# Patient Record
Sex: MALE | Race: WHITE | Employment: FULL TIME | URBAN - METROPOLITAN AREA
[De-identification: names, ages, dates, MRNs, and addresses within clinical notes are randomized per-mention and may not be internally consistent; named-entity substitution may affect disease eponyms.]

---

## 2018-01-22 ENCOUNTER — NURSE TRIAGE (OUTPATIENT)
Dept: NURSING | Facility: CLINIC | Age: 58
End: 2018-01-22

## 2018-01-22 ENCOUNTER — TRANSFERRED RECORDS (OUTPATIENT)
Dept: HEALTH INFORMATION MANAGEMENT | Facility: CLINIC | Age: 58
End: 2018-01-22

## 2018-01-22 NOTE — TELEPHONE ENCOUNTER
APPT INFO    Date /Time: 1/24/18   Reason for Appt: Back Pain/LE weakness/muscle spasms   Ref Provider/Clinic: Dr Pallas, [facility]   Are there internal records? Yes/No?  IF YES, list clinic names: No   Are there outside records? Yes/No? Yes  See Above   Patient Contact (Y/N) & Call Details: No referred  Unable to access CareEverywhere   Action: Requested records     OUTSIDE RECORDS CHECKLIST     CLINIC NAME COMMENTS REC (x) IMG (x)   [facility]  x na

## 2018-01-22 NOTE — TELEPHONE ENCOUNTER
Excruciating Back pain following , falling off one step around sahara 2017 and following with [place] provider since   - given Percocet and prednisone .  Has MRI  Scheduled for today . His PCP advised [facility] and FNA agrees but  Pt is considering having MRI that scheduled 1st . Unsure what Pt will do .   .Fatoumata Patten RN [facility] nurse advisors.    Reason for Disposition    Weakness of a leg or foot (e.g., unable to bear weight, dragging foot)    Unable to walk    Additional Information    Negative: Passed out (i.e., lost consciousness, collapsed and was not responding)    Negative: Shock suspected (e.g., cold/pale/clammy skin, too weak to stand, low BP, rapid pulse)    Negative: Sounds like a life-threatening emergency to the triager    Negative: Major injury to the back (e.g., MVA, fall > 10 feet or 3 meters, penetrating injury, etc.)    Negative: Followed a tailbone injury    Negative: [1] Pain in the upper back over the ribs (rib cage) AND [2] radiates (travels, goes) into chest    Negative: [1] Pain in the upper back over the ribs (rib cage) AND [2] worsened by coughing (or clearly increases with breathing)    Negative: Back pain during pregnancy    Negative: Pain mainly in flank (i.e., in the side, over the lower ribs or just below the ribs)    Negative: [1] Unable to urinate (or only a few drops) > 4 hours AND     [2] bladder feels very full (e.g., palpable bladder or strong urge to urinate)    Negative: [1] Urinary or bowel incontinence (i.e., loss of bladder or bowel control) AND [2] new onset    Negative: Numbness in groin or rectal area (i.e., loss of sensation)    Negative: [1] SEVERE back pain (e.g., excruciating) AND [2] sudden onset AND [3] age > 60    Protocols used: BACK PAIN-ADULT-

## 2018-01-23 NOTE — TELEPHONE ENCOUNTER
Records Received From: [facility]     Date/Exam/Location  (specify location if different)   Office Notes: 8/31/17, 10/31/17, 12/29/17, 1/11/18, 1/22/18   Missing: - MRI 1/22/18

## 2018-01-24 ENCOUNTER — OFFICE VISIT (OUTPATIENT)
Dept: NEUROSURGERY | Facility: CLINIC | Age: 58
End: 2018-01-24

## 2018-01-24 ENCOUNTER — OFFICE VISIT (OUTPATIENT)
Dept: NEUROLOGY | Facility: CLINIC | Age: 58
End: 2018-01-24
Payer: COMMERCIAL

## 2018-01-24 ENCOUNTER — PRE VISIT (OUTPATIENT)
Dept: NEUROLOGY | Facility: CLINIC | Age: 58
End: 2018-01-24

## 2018-01-24 VITALS
WEIGHT: 237.1 LBS | DIASTOLIC BLOOD PRESSURE: 89 MMHG | SYSTOLIC BLOOD PRESSURE: 144 MMHG | HEIGHT: 72 IN | BODY MASS INDEX: 32.12 KG/M2 | HEART RATE: 72 BPM | OXYGEN SATURATION: 97 %

## 2018-01-24 DIAGNOSIS — M54.16 LUMBAR RADICULOPATHY: Primary | ICD-10-CM

## 2018-01-24 DIAGNOSIS — M51.26 DISPLACEMENT OF LUMBAR INTERVERTEBRAL DISC WITHOUT MYELOPATHY: Primary | ICD-10-CM

## 2018-01-24 DIAGNOSIS — G83.4 CAUDA EQUINA COMPRESSION (H): Primary | ICD-10-CM

## 2018-01-24 DIAGNOSIS — M51.16 LUMBAR DISC HERNIATION WITH RADICULOPATHY: Primary | ICD-10-CM

## 2018-01-24 ASSESSMENT — PAIN SCALES - GENERAL: PAINLEVEL: SEVERE PAIN (7)

## 2018-01-24 NOTE — PROGRESS NOTES
Neurosurgery Clinic     01/24/18    HPI:   Bud Chiang is a 57 year old male who has a history of back pain with very recent progression in 12/17 to radiating pain down his left lower extremity. He was seen by Dr. Ponce today in clinic who recommended the patient be seen by a neurosurgeon urgently given the size of his herniated disc.     He states that he was carrying Saw presents and had a fall down the stairs. This resulted in new shooting pain down his left lateral leg into the dorsum of his feet consistent with an L5 radiculopathy. He denies any bowel or bladder incontinence. The patient does note that erections are possible but more difficult to attain, though attests that pain is a distraction and contributing to his dysfunction. He is requesting to see Dr. Tijerina as his brother recently had surgery with Dr. Tijerina.     SurgHx:  No prior history of spinal surgery     PMH:  Hyperlipidemia    Medications:   Current Outpatient Prescriptions   Medication     Oxycodone-Acetaminophen (PERCOCET PO)     SIMVASTATIN PO     BuPROPion HCl (WELLBUTRIN PO)     No current facility-administered medications for this visit.      FamHx:  No family history of bleeding or clotting disorders    SocHx:  Social History     Social History     Marital status:      Spouse name: N/A     Number of children: N/A     Years of education: N/A     Occupational History     Not on file.     Social History Main Topics     Smoking status: Never Smoker     Smokeless tobacco: Never Used     Alcohol use Not on file     Drug use: Not on file     Sexual activity: Not on file     Other Topics Concern     Not on file     Social History Narrative     No narrative on file     Physical exam:  /89 (BP Location: Left arm, Patient Position: Sitting, Cuff Size: Adult Regular)  Pulse 72  Ht 1.829 m (6')  Wt 107.5 kg (237 lb 1.6 oz)  SpO2 97%  BMI 32.16 kg/m2    General: appears comfortable, in no acute distress   HEENT:  "normocephalic/atraumatic   NEURO:  Mental: Alert and oriented x 3.   CN: No facial asymmetry. No aphasia or dysarthria.   Motor: No pronator drift     Deltoid Triceps Biceps Wrist Ext Wrist Flex    R 5 5 5 5 5 5   L 5 5 5 5 5 5    Hip Flex. Knee Ext. Knee Flex. Dorsiflex. Plantarflex.    R 5 5 5 5 5    L 5 5 5 4 5      Sensory: Intact to light touch bilaterally in upper and lower extremities   Reflexes: Decreased reflex on the L patellar of 0+ and 2+ on the R. 2+ bilaterally in the biceps and brachioradialis tendons. No Dobson's or clonus bilaterally.     Imaging:    MRI L spine 1/22/17: large L4-5 disc herniation leading to canal stenosis at this level     Assessment: L4-5 disc herniation with spinal canal stenosis and L5 radiculopathy    Plan:   --will follow-up with Dr. Tijerina     Patient seen and discussed with Juan Miguel Sosa MD.     Syed \"Catracho\" MD [facility]  Neurosurgery, PGY-1               "

## 2018-01-24 NOTE — LETTER
2018       RE: Bud Chiang  [address]  [place] [address]     Dear Colleague,    Thank you for referring your patient, Bud Chiang, to the [facility] NEUROSURGERY at [facility]. Please see a copy of my visit note below.    Service Date: 2018      HISTORY OF PRESENT ILLNESS:  Mr. Chiang was seen today at the request of Dr. Ponce because of very large lumbar disk extrusion.  A detailed summary has been entered into the record by my resident, Syed Dumas, which I have reviewed, amended and incorporated into my own.      The primary reason for seeing Mr. Chiang today was to be sure that it would be reasonably safe for him to wait to see Dr. Lilly Tijerina in the Spinal Neurosurgery division tomorrow.      ASESSMENT:  Our examination of Mr. Chiang and review of his history suggests that while he has significant discomfort and some objective findings related to his disk herniation, he does not have signs or symptoms that suggest a requirement for urgent surgical intervention; and therefore, it is reasonable to defer further neurosurgical evaluation until we can see Dr. Tijerina tomorrow morning.      PLAN:  We did discuss signs and symptoms that if they occurred tonight should prompt him to come to the hospital and call us so that further evaluation could be done urgently if necessary.      We have spoken with Dr. Tijerina and made arrangements for her to see him tomorrow.         JUAN MIGUEL SOSA MD             D: 2018   T: 2018   MT: [id number]      Name:     BUD CHIANG   MRN:      [id number]        Account:      [id number]   :      1960           Service Date: 2018      Document: [id number]        Again, thank you for allowing me to participate in the care of your patient.      Sincerely,    Juan Miguel Sosa MD

## 2018-01-24 NOTE — MR AVS SNAPSHOT
After Visit Summary   1/24/2018    Bud Chiang    MRN: [id number]           Patient Information     Date Of Birth          1960        Visit Information        Provider Department      1/24/2018 8:00 AM Catarino Ponce MD [facility] Neurology        Today's Diagnoses     Cauda equina compression (H)    -  1       Follow-ups after your visit        Who to contact     Please call your clinic at [phone] to:    Ask questions about your health    Make or cancel appointments    Discuss your medicines    Learn about your test results    Speak to your doctor   If you have compliments or concerns about an experience at your clinic, or if you wish to file a complaint, please contact [facility] Patient Relations at [phone] or email us at [email or web address]         Additional Information About Your Visit        MyChart Information     Matthew Kenney [employer]isinet gives you secure access to your electronic health record. If you see a primary care provider, you can also send messages to your care team and make appointments. If you have questions, please call your primary care clinic.  If you do not have a primary care provider, please call [phone] and they will assist you.      CENX is an electronic gateway that provides easy, online access to your medical records. With CENX, you can request a clinic appointment, read your test results, renew a prescription or communicate with your care team.     To access your existing account, please contact your [facility] Clinic or call [phone] for assistance.        Care EveryWhere ID     This is your Care EveryWhere ID. This could be used by other organizations to access your [place] medical records  [id number]        Your Vitals Were     Pulse Height Pulse Oximetry BMI (Body Mass Index)          72 1.829 m (6') 97% 32.16 kg/m2         Blood Pressure from Last 3 Encounters:   01/26/18  133/84   01/25/18 133/86   01/25/18 133/86    Weight from Last 3 Encounters:   01/26/18 106.4 kg (234 lb 9.1 oz)   01/25/18 107.5 kg (236 lb 14.4 oz)   01/25/18 107.5 kg (236 lb 15.9 oz)              Today, you had the following     No orders found for display       Primary Care Provider    None Specified       [facility] [address]  [facility] [id number]        Equal Access to Services     MILAN OLSEN : Hadii aad ku hadasho Soomaali, waaxda luqadaha, qaybta kaalmada adeegyada, waxay santoshin hayaan adefaheem walker . So [facility] [phone].    ATENCIÓN: Si habla español, tiene a sherman disposición servicios gratuitos de asistencia lingüística. Llame al [phone].    We comply with applicable federal civil rights laws and [place] laws. We do not discriminate on the basis of race, color, national origin, age, disability, sex, sexual orientation, or gender identity.            Thank you!     Thank you for choosing [facility] NEUROLOGY  for your care. Our goal is always to provide you with excellent care. Hearing back from our patients is one way we can continue to improve our services. Please take a few minutes to complete the written survey that you may receive in the mail after your visit with us. Thank you!             Your Updated Medication List - Protect others around you: Learn how to safely use, store and throw away your medicines at www.disposemymeds.org.          This list is accurate as of 1/24/18 11:59 PM.  Always use your most recent med list.                   Brand Name Dispense Instructions for use Diagnosis    * PERCOCET PO      Take by mouth 4 times daily as needed        * oxyCODONE-acetaminophen 5-325 MG per tablet    PERCOCET    50 tablet    Take 1-2 tablets by mouth 4 times daily as needed    Lumbar stenosis with neurogenic claudication       polyethylene glycol powder    MIRALAX    500 g    Take 17 g (1 capful) by mouth 2 times daily    Lumbar stenosis with neurogenic  claudication       senna-docusate 8.6-50 MG per tablet    SENOKOT-S;PERICOLACE    60 tablet    Take 1 tablet by mouth 2 times daily    Lumbar stenosis with neurogenic claudication       SIMVASTATIN PO      Take 20 mg by mouth At Bedtime        WELLBUTRIN PO      Take 150 mg by mouth 2 times daily        * Notice:  This list has 2 medication(s) that are the same as other medications prescribed for you. Read the directions carefully, and ask your doctor or other care provider to review them with you.

## 2018-01-24 NOTE — MR AVS SNAPSHOT
After Visit Summary   1/24/2018    Bud Chiang    MRN: [id number]           Patient Information     Date Of Birth          1960        Visit Information        Provider Department      1/24/2018 8:11 AM Juan Miguel Sosa MD [facility] Neurosurgery        Today's Diagnoses     Lumbar disc herniation with radiculopathy    -  1       Follow-ups after your visit        Who to contact     Please call your clinic at [phone] to:    Ask questions about your health    Make or cancel appointments    Discuss your medicines    Learn about your test results    Speak to your doctor   If you have compliments or concerns about an experience at your clinic, or if you wish to file a complaint, please contact [facility] Patient Relations at [phone] or email us at [email or web address]         Additional Information About Your Visit        MyChart Information     [employer] gives you secure access to your electronic health record. If you see a primary care provider, you can also send messages to your care team and make appointments. If you have questions, please call your primary care clinic.  If you do not have a primary care provider, please call [phone] and they will assist you.      Mowbly is an electronic gateway that provides easy, online access to your medical records. With Mowbly, you can request a clinic appointment, read your test results, renew a prescription or communicate with your care team.     To access your existing account, please contact your [facility] or call [phone] for assistance.        Care EveryWhere ID     This is your Care EveryWhere ID. This could be used by other organizations to access your [place] medical records  [id number]         Blood Pressure from Last 3 Encounters:   01/26/18 133/84   01/25/18 133/86   01/25/18 133/86    Weight from Last 3 Encounters:   01/26/18 106.4 kg (234 lb 9.1  oz)   01/25/18 107.5 kg (236 lb 14.4 oz)   01/25/18 107.5 kg (236 lb 15.9 oz)              Today, you had the following     No orders found for display       Primary Care Provider    None Specified       [facility] [address]  [facility] [address]        Equal Access to Services     DENNISRADHA PRETTY : Hadii aad ku hadasho Soomaali, waaxda luqadaha, qaybta kaalmada adeegyada, iva frostin haymargen adefaheem pattersontequilazainab walker . So [facility] [phone].    ATENCIÓN: Si habla español, tiene a sherman disposición servicios gratuitos de asistencia lingüística. Llame al [phone].    We comply with applicable federal civil rights laws and [place] laws. We do not discriminate on the basis of race, color, national origin, age, disability, sex, sexual orientation, or gender identity.            Thank you!     Thank you for choosing [facility] NEUROSURGERY  for your care. Our goal is always to provide you with excellent care. Hearing back from our patients is one way we can continue to improve our services. Please take a few minutes to complete the written survey that you may receive in the mail after your visit with us. Thank you!             Your Updated Medication List - Protect others around you: Learn how to safely use, store and throw away your medicines at www.disposemymeds.org.          This list is accurate as of 1/24/18 11:59 PM.  Always use your most recent med list.                   Brand Name Dispense Instructions for use Diagnosis    * PERCOCET PO      Take by mouth 4 times daily as needed        * oxyCODONE-acetaminophen 5-325 MG per tablet    PERCOCET    50 tablet    Take 1-2 tablets by mouth 4 times daily as needed    Lumbar stenosis with neurogenic claudication       polyethylene glycol powder    MIRALAX    500 g    Take 17 g (1 capful) by mouth 2 times daily    Lumbar stenosis with neurogenic claudication       senna-docusate 8.6-50 MG per tablet    SENOKOT-S;PERICOLACE    60 tablet    Take 1  tablet by mouth 2 times daily    Lumbar stenosis with neurogenic claudication       SIMVASTATIN PO      Take 20 mg by mouth At Bedtime        WELLBUTRIN PO      Take 150 mg by mouth 2 times daily        * Notice:  This list has 2 medication(s) that are the same as other medications prescribed for you. Read the directions carefully, and ask your doctor or other care provider to review them with you.

## 2018-01-24 NOTE — LETTER
[facility] NEUROLOGY  [address] [address]  Phone: [phone]  Fax: [phone]    January 24, 2018        Bud Chiang  [address] [facility] [address]          To whom it may concern:    RE: Bud Chiang    {WORK NOTE CHOICES:351444}    Please contact me for questions or concerns.      Sincerely,        Catarino Ponce MD
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
## 2018-01-25 ENCOUNTER — OFFICE VISIT (OUTPATIENT)
Dept: SURGERY | Facility: CLINIC | Age: 58
End: 2018-01-25
Payer: COMMERCIAL

## 2018-01-25 ENCOUNTER — OFFICE VISIT (OUTPATIENT)
Dept: NEUROSURGERY | Facility: CLINIC | Age: 58
End: 2018-01-25
Payer: COMMERCIAL

## 2018-01-25 ENCOUNTER — ALLIED HEALTH/NURSE VISIT (OUTPATIENT)
Dept: SURGERY | Facility: CLINIC | Age: 58
End: 2018-01-25
Payer: COMMERCIAL

## 2018-01-25 ENCOUNTER — ANESTHESIA EVENT (OUTPATIENT)
Dept: SURGERY | Facility: CLINIC | Age: 58
End: 2018-01-25
Payer: COMMERCIAL

## 2018-01-25 ENCOUNTER — RADIANT APPOINTMENT (OUTPATIENT)
Dept: GENERAL RADIOLOGY | Facility: CLINIC | Age: 58
End: 2018-01-25
Attending: NEUROLOGICAL SURGERY
Payer: COMMERCIAL

## 2018-01-25 ENCOUNTER — APPOINTMENT (OUTPATIENT)
Dept: LAB | Facility: CLINIC | Age: 58
End: 2018-01-25
Payer: COMMERCIAL

## 2018-01-25 VITALS
DIASTOLIC BLOOD PRESSURE: 86 MMHG | RESPIRATION RATE: 18 BRPM | OXYGEN SATURATION: 98 % | TEMPERATURE: 98.1 F | HEART RATE: 72 BPM | HEIGHT: 72 IN | WEIGHT: 236.9 LBS | SYSTOLIC BLOOD PRESSURE: 133 MMHG | BODY MASS INDEX: 32.09 KG/M2

## 2018-01-25 VITALS
HEIGHT: 72 IN | WEIGHT: 236.99 LBS | DIASTOLIC BLOOD PRESSURE: 86 MMHG | HEART RATE: 72 BPM | SYSTOLIC BLOOD PRESSURE: 133 MMHG | OXYGEN SATURATION: 98 % | BODY MASS INDEX: 32.1 KG/M2

## 2018-01-25 DIAGNOSIS — M51.26 DISPLACEMENT OF LUMBAR INTERVERTEBRAL DISC WITHOUT MYELOPATHY: ICD-10-CM

## 2018-01-25 DIAGNOSIS — M51.16 LUMBAR DISC HERNIATION WITH RADICULOPATHY: ICD-10-CM

## 2018-01-25 DIAGNOSIS — R06.83 SNORING: ICD-10-CM

## 2018-01-25 DIAGNOSIS — R06.81 WITNESSED EPISODE OF APNEA: Primary | ICD-10-CM

## 2018-01-25 DIAGNOSIS — M48.062 LUMBAR STENOSIS WITH NEUROGENIC CLAUDICATION: Primary | ICD-10-CM

## 2018-01-25 LAB
ABO + RH BLD: NORMAL
ABO + RH BLD: NORMAL
ANION GAP SERPL CALCULATED.3IONS-SCNC: 5 MMOL/L (ref 3–14)
APTT PPP: 25 SEC (ref 22–37)
BASOPHILS # BLD AUTO: 0 10E9/L (ref 0–0.2)
BASOPHILS NFR BLD AUTO: 0.2 %
BLD GP AB SCN SERPL QL: NORMAL
BLOOD BANK CMNT PATIENT-IMP: NORMAL
BLOOD BANK CMNT PATIENT-IMP: NORMAL
BUN SERPL-MCNC: 20 MG/DL (ref 7–30)
CALCIUM SERPL-MCNC: 9 MG/DL (ref 8.5–10.1)
CHLORIDE SERPL-SCNC: 97 MMOL/L (ref 94–109)
CO2 SERPL-SCNC: 30 MMOL/L (ref 20–32)
CREAT SERPL-MCNC: 1 MG/DL (ref 0.66–1.25)
DIFFERENTIAL METHOD BLD: NORMAL
EOSINOPHIL # BLD AUTO: 0.1 10E9/L (ref 0–0.7)
EOSINOPHIL NFR BLD AUTO: 1.4 %
ERYTHROCYTE [DISTWIDTH] IN BLOOD BY AUTOMATED COUNT: 12.4 % (ref 10–15)
GFR SERPL CREATININE-BSD FRML MDRD: 77 ML/MIN/1.7M2
GLUCOSE SERPL-MCNC: 89 MG/DL (ref 70–99)
HCT VFR BLD AUTO: 47.2 % (ref 40–53)
HGB BLD-MCNC: 15.6 G/DL (ref 13.3–17.7)
IMM GRANULOCYTES # BLD: 0.1 10E9/L (ref 0–0.4)
IMM GRANULOCYTES NFR BLD: 0.8 %
INR PPP: 1 (ref 0.86–1.14)
LYMPHOCYTES # BLD AUTO: 1.3 10E9/L (ref 0.8–5.3)
LYMPHOCYTES NFR BLD AUTO: 15.2 %
MCH RBC QN AUTO: 31.4 PG (ref 26.5–33)
MCHC RBC AUTO-ENTMCNC: 33.1 G/DL (ref 31.5–36.5)
MCV RBC AUTO: 95 FL (ref 78–100)
MONOCYTES # BLD AUTO: 1 10E9/L (ref 0–1.3)
MONOCYTES NFR BLD AUTO: 11.6 %
NEUTROPHILS # BLD AUTO: 6.2 10E9/L (ref 1.6–8.3)
NEUTROPHILS NFR BLD AUTO: 70.8 %
NRBC # BLD AUTO: 0 10*3/UL
NRBC BLD AUTO-RTO: 0 /100
PLATELET # BLD AUTO: 238 10E9/L (ref 150–450)
POTASSIUM SERPL-SCNC: 4.7 MMOL/L (ref 3.4–5.3)
RBC # BLD AUTO: 4.97 10E12/L (ref 4.4–5.9)
SODIUM SERPL-SCNC: 132 MMOL/L (ref 133–144)
SPECIMEN EXP DATE BLD: NORMAL
WBC # BLD AUTO: 8.8 10E9/L (ref 4–11)

## 2018-01-25 RX ORDER — ACETAMINOPHEN 325 MG/1
975 TABLET ORAL ONCE
Status: CANCELLED | OUTPATIENT
Start: 2018-01-25 | End: 2018-01-25

## 2018-01-25 ASSESSMENT — ENCOUNTER SYMPTOMS
MUSCLE WEAKNESS: 1
DIFFICULTY URINATING: 1
BACK PAIN: 1
DIZZINESS: 1
SPUTUM PRODUCTION: 1
HALLUCINATIONS: 0
DISTURBANCES IN COORDINATION: 1
NECK PAIN: 0
SPEECH CHANGE: 0
TREMORS: 0
ARTHRALGIAS: 0
FLANK PAIN: 0
COUGH DISTURBING SLEEP: 1
TINGLING: 1
HEADACHES: 0
WEIGHT GAIN: 1
POSTURAL DYSPNEA: 0
FEVER: 1
FATIGUE: 1
WEAKNESS: 1
DECREASED CONCENTRATION: 1
DYSURIA: 0
NUMBNESS: 1
ALTERED TEMPERATURE REGULATION: 1
NIGHT SWEATS: 0
INCREASED ENERGY: 1
HEMATURIA: 0
DEPRESSION: 1
NERVOUS/ANXIOUS: 1
LOSS OF CONSCIOUSNESS: 0
MYALGIAS: 1
SHORTNESS OF BREATH: 0
POLYPHAGIA: 0
INSOMNIA: 1
DYSPNEA ON EXERTION: 0
PARALYSIS: 0
JOINT SWELLING: 0
PANIC: 0
SNORES LOUDLY: 1
CHILLS: 0
STIFFNESS: 1
WHEEZING: 0
MEMORY LOSS: 0
COUGH: 1
POLYDIPSIA: 0
DECREASED APPETITE: 0
SEIZURES: 0
MUSCLE CRAMPS: 1
WEIGHT LOSS: 0
HEMOPTYSIS: 0

## 2018-01-25 ASSESSMENT — PAIN SCALES - GENERAL: PAINLEVEL: SEVERE PAIN (7)

## 2018-01-25 ASSESSMENT — LIFESTYLE VARIABLES: TOBACCO_USE: 0

## 2018-01-25 NOTE — MR AVS SNAPSHOT
After Visit Summary   2018    Bud Chiang    MRN: [id number]           Patient Information     Date Of Birth          1960        Visit Information        Provider Department      2018 10:00 AM Rn, [facility]        Care Instructions    Preparing for Your Surgery      Name:  Bud Chiang   MRN:  [id number]   :  1960   Today's Date:  2018     Arriving for surgery:  Left lumbar Hemilaminectomy/Discectomy   Surgery date:  2017  Arrival time:  5:30 am  Please come to:       [facility] [address]  [address] [place]  [address]    -   parking is available in front of the hospital from 5:15 am to 8:00 pm    -  Stop at the Information Desk in the lobby    -   Inform the information person that you are here for surgery. An escort to 3c will be provided. If you would not like an escort, please proceed to 3C on the 3rd floor. [phone]     What can I eat or drink?  -  You may have solid food or milk products until 8 hours prior to your surgery.  Nothing after 11 pm   -  You may have water, apple juice or 7up/Sprite until 2 hours prior to your surgery.  Until 5:30 am arrival time     Which medicines can I take?  -  Do NOT take these medications in the morning, the day of surgery:      Hold aspirin, vitamins and supplements between now and surgery       -  Please take these medications the day of surgery:       Bupropion (Wellbutrin)        Oxycodone-Acetaminophen (Percocet) if needed       How do I prepare myself?  -  Take two showers: one the night before surgery; and one the morning of surgery.         Use Scrubcare or Hibiclens to wash from neck down.  You may use your own shampoo and conditioner. No other hair products.   -  Do NOT use lotion, powder, deodorant, or antiperspirant the day of your surgery.  -  Do NOT wear any makeup, fingernail polish or jewelry.  -Do not bring your  own medications to the hospital, except for inhalers and eye drops.  -  Bring your ID and insurance card.    Questions or Concerns:  If you have questions or concerns, please call the  Preoperative Assessment Center, Monday-Friday 7AM-7PM:  [phone]          AFTER YOUR SURGERY  Breathing exercises   Breathing exercises help you recover faster. Take deep breaths and let the air out slowly. This will:     Help you wake up after surgery.    Help prevent complications like pneumonia.  Preventing complications will help you go home sooner.   Nausea and vomiting   You may feel sick to your stomach after surgery; if so, let your nurse know.    Pain control:  After surgery, you may have pain. Our goal is to help you manage your pain. Pain medicine will help you feel comfortable enough to do activities that will help you heal.  These activities may include breathing exercises, walking and physical therapy.   To help your health care team treat your pain we will ask: 1) If you have pain  2) where it is located 3) describe your pain in your words  Methods of pain control include medications given by mouth, vein or by nerve block for some surgeries.  We may give you a pain control pump that will:  1) Deliver the medicine through a tube placed in your vein  2) Control the amount of medicine you receive  3) Allow you to push a button to deliver a dose of pain medicine  Sequential Compression Device (SCD) or Pneumo Boots:  You may need to wear SCD S on your legs or feet. These are wraps connected to a machine that pumps in air and releases it. The repeated pumping helps prevent blood clots from forming.           Follow-ups after your visit        Your next 10 appointments already scheduled     Jan 25, 2018 10:00 AM CST   (Arrive by 9:45 AM)   PAC RN ASSESSMENT with Kusum Pac Rn   [facility] ([facility])    [address]  [address] [facility] [place] [address]    [phone]            Jan 25, 2018 10:30 AM CST   (Arrive by 10:15 AM)   PAC Anesthesia Consult with  Pac Anesthesiologist   [facility] ([facility])    [address]  [address] [facility] [id number] [phone]            Jan 25, 2018 10:45 AM CST   LAB with  LAB   [facility] ([facility])    [address]  [address] [facility] [id number] [phone]           Please do not eat 10-12 hours before your appointment if you are coming in fasting for labs on lipids, cholesterol, or glucose (sugar). This does not apply to pregnant women. Water, hot tea and black coffee (with nothing added) are okay. Do not drink other fluids, diet soda or chew gum.            Jan 26, 2018   Procedure with Lilly Tijerina MD   [facility] [place], Same Day Surgery (--)    [address] [address] [phone]              Future tests that were ordered for you today     Open Future Orders        Priority Expected Expires Ordered    SLEEP EVALUATION & MANAGEMENT REFERRAL - [facility]  [phone] (Age 18 and up) Routine  1/25/2019 1/25/2018            Who to contact     Please call your clinic at [phone] to:    Ask questions about your health    Make or cancel appointments    Discuss your medicines    Learn about your test results    Speak to your doctor   If you have compliments or concerns about an experience at your clinic, or if you wish to file a complaint, please contact [facility] Patient Relations at [phone] or email us at [email or web address]         Additional Information About Your Visit        Changelighthart Information     InterResolve is an electronic gateway that provides easy, online access to your medical records. With InterResolve, you can request a clinic appointment, read your test results, renew a prescription or communicate  with your care team.     To sign up for [email or web address] visit the website at [email or web address]   You will be asked to enter the access code listed below, as well as some personal information. Please follow the directions to create your username and password.     Your access code is: [email or web address]  Expires: 2018  6:30 AM     Your access code will  in 90 days. If you need help or a new code, please contact your [facility] or call [phone] for assistance.        Care EveryWhere ID     This is your Care EveryWhere ID. This could be used by other organizations to access your [facility] medical records  [id number]         Blood Pressure from Last 3 Encounters:   18 133/86   18 133/86   18 144/89    Weight from Last 3 Encounters:   18 107.5 kg (236 lb 14.4 oz)   18 107.5 kg (236 lb 15.9 oz)   18 107.5 kg (237 lb 1.6 oz)              Today, you had the following     No orders found for display       Primary Care Provider    None Specified       No primary provider on file.        Equal Access to Services     MILAN OLSEN : Hadii kaden Hardin, wadenisda michael, qaybta kaalmada nery, iva walker . So [facility] [phone].    ATENCIÓN: Si habla español, tiene a sherman disposición servicios gratuitos de asistencia lingüística. [facility] [phone].    We comply with applicable federal civil rights laws and [place] laws. We do not discriminate on the basis of race, color, national origin, age, disability, sex, sexual orientation, or gender identity.            Thank you!     Thank you for choosing [facility] PREOPERATIVE ASSESSMENT CENTER  for your care. Our goal is always to provide you with excellent care. Hearing back from our patients is one way we can continue to improve our services. Please take a few minutes to complete the written survey that you may receive in the mail after your visit with us. Thank  you!             Your Updated Medication List - Protect others around you: Learn how to safely use, store and throw away your medicines at www.disposemymeds.org.          This list is accurate as of 1/25/18  9:57 AM.  Always use your most recent med list.                   Brand Name Dispense Instructions for use Diagnosis    AIRBORNE PO      Take by mouth as needed        PERCOCET PO      Take by mouth 4 times daily as needed        SIMVASTATIN PO      Take 20 mg by mouth At Bedtime        WELLBUTRIN PO      Take 150 mg by mouth 2 times daily

## 2018-01-25 NOTE — ANESTHESIA PREPROCEDURE EVALUATION
"  Anesthesia Evaluation     . Pt has had prior anesthetic. Type: General    No history of anesthetic complications          ROS/MED HX    ENT/Pulmonary:     (+)DIRK risk factors snores loudly, daytime somnolence, observed stopped breathing other ENT- left ear hearing loss, , . .   (-) tobacco use   Neurologic:     (+)other neuro bilateral lumbar radiculopathy    Cardiovascular:     (+) Dyslipidemia, ----. : . . . :. . No previous cardiac testing       METS/Exercise Tolerance:  >4 METS   Hematologic:  - neg hematologic  ROS      (-) history of blood clots and History of Transfusion   Musculoskeletal:   (+) , , other musculoskeletal- lumbar radiculopathy, stenosis and disc herniation = low back pain      GI/Hepatic:     (+) Other GI/Hepatic \"mild IBS\" - no meds      Renal/Genitourinary:  - ROS Renal section negative       Endo:  - neg endo ROS   (+) Obesity, .      Psychiatric:     (+) psychiatric history depression      Infectious Disease:  - neg infectious disease ROS       Malignancy:      - no malignancy   Other:    (+) no H/O Chronic Pain,                   Physical Exam  Normal systems: cardiovascular and pulmonary    Airway   Mallampati: III  TM distance: >3 FB  Neck ROM: full    Dental   (+) missing  Comment: Multiple missing teeth - removed in childhood due to overcrowding.      Cardiovascular   Rhythm and rate: regular and normal      Pulmonary                PAC Discussion and Assessment    ASA Classification: 2  Case is suitable for: [place]  Anesthetic techniques and relevant risks discussed: GA  Invasive monitoring and risk discussed:   Types:   Possibility and Risk of blood transfusion discussed: Yes  NPO instructions given:   Additional anesthetic preparation and risks discussed:   Needs early admission to pre-op area:   Other:     PAC Resident/NP Anesthesia Assessment:  Bud Chiang is a 57 year old male scheduled for an Open Left Lumbar 4-5 Hemilaminectomy And Microdiscectomy Possible Bilateral " Approach by Dr. Tijerina in treatment of lumbar radiculopathy, stenosis, and disc herniation.  PAC referral for risk assessment and optimization for anesthesia with comorbid conditions of: obesity, depression, and hyperlipidemia.     Pre-operative considerations:  1.  Cardiac:  Functional status- METS >4.  He currently is unable to be very active due to his back injury, but last month prior to the injury he was going to the gym 4-5 days per week doing 30 mins of cardio and 30 mins of weight training with no cardiopulmonary complications.  Intermediate risk surgery with 0.4% risk of major adverse cardiac event.  Risk of MACE < 1%. METS>4.  No further cardiac evaluation needed per 2014 ACC/AHA guidelines for non-cardiac surgery.  2.  Pulm:  Airway feasible.  DIKR risk: high.  The patient and his wife report that they are pretty sure he has sleep apnea due to apneic episodes and loud snoring, but that he had requested a referral from his primary care provider in the past for a sleep study, but the request was denied.  A referral has been given today for the patient to arrange an appointment as soon as he is able for evaluation.  We discussed the risks of untreated sleep apnea at length.  Never smoker.  He notes that he started getting a mild, dry, tickling cough last night but has had no fevers or any other upper respiratory symptoms.   He is obese with a BMI >30.  3.  GI:  Risk of PONV score = 2.  If > 2, anti-emetic intervention recommended.  4. Endo:  He has had 3 short courses of prednisone in the past couple months for back pain.  He completed his last course of prednisone 1 week ago.  Stress dose steroids as per anesthesia DOS.  5. Musculoskeletal:  Consider fall risk precautions due to back pain with radiculopathy.  6. Psych:  Patient notes a considerable fear of needles and requests that anesthesia order something to help calm him in pre-op prior to IV insertion.    7.  FEN:  Sodium is slightly low at 132.         VTE risk: 0.5%    Patient is optimized and is acceptable candidate for the proposed procedure.  No further diagnostic evaluation is needed.     Patient also evaluated by Dr. Powers. See recommendations below.     For further details of assessment, testing, and physical exam please see H and P completed on same date.          Lashonda Santiago DNP, RN, APRN      Reviewed and Signed by PAC Mid-Level Provider/Resident  Mid-Level Provider/Resident: Lashonda Santiago DNP, RN, APRN  Date: 1/25/2018  Time: 1217    Attending Anesthesiologist Anesthesia Assessment:  57 year old for back surgery. Chart reviewed, patient seen and evaluated; agree with above assessment. Patient has no significant cardiac or pulmonary disease. He is very anxious and has a tremendous needle phobia. I have ordered pee so the nurses can give some as soon a the IV is in (not have to wait for anes to see him - he won't have block).     Patient is appropriate for the planned procedure without further workup or medical management change. The final anesthesia plan will be determined by the physician anesthesiologist caring for the patient on the day of surgery.    Reviewed and Signed by PAC Anesthesiologist  Anesthesiologist: ara  Date: 1/25/2018  Time:   Pass/Fail: Pass  Disposition:     PAC Pharmacist Assessment:        Pharmacist:   Date:   Time:      Anesthesia Plan      History & Physical Review  History and physical reviewed and following examination; no interval change.    ASA Status:  2 .    NPO Status:  > 8 hours    Plan for General and ETT with Intravenous induction. Maintenance will be Balanced.    PONV prophylaxis:  Ondansetron (or other 5HT-3) and Dexamethasone or Solumedrol  Additional equipment: 2nd IV      Postoperative Care  Postoperative pain management:  IV analgesics.      Consents  Anesthetic plan, risks, benefits and alternatives discussed with:  Patient.  Use of blood products discussed: Yes.   Use of blood products discussed  with Patient.  Consented to blood products.  .                          .

## 2018-01-25 NOTE — H&P
Pre-Operative H & P     CC:  Preoperative exam to assess for increased cardiopulmonary risk while undergoing surgery and anesthesia.    Date of Encounter: 1/25/2018  Primary Care Physician:  No primary care provider on file.  Associated diagnosis: Lumbar radiculopathy [M54.16]  - Primary       HPI  Bud Chiang is a 57 year old male who presents for pre-operative H & P in preparation for an Open Left Lumbar 4-5 Hemilaminectomy And Microdiscectomy Possible Bilateral Approach with Dr. Tijerina on 1/26/18 at [facility].     Bud Chiang is a 57 year old male with obesity, depression and hyperlipidemia that had a back injury on December 24, 2017 from accidentally missing a step and falling down his stairs.  Despite physical therapy, chiropractic, acupuncture, oral steroids and Percocet he has had no longstanding pain relief since the initial injury.  It has been determined that he has a lumbar disc herniation and stenosis causing the back pain with bilateral radiculopathy.  He obtained a referral to Dr. 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  The above listed procedure has been recommended for treatment.      History is obtained from the patient.     Past Medical History  Past Medical History:   Diagnosis Date     Depression      Hyperlipidemia      Left ear hearing loss      Lumbar disc herniation      Lumbar radiculopathy      Lumbar stenosis      Obese        Past Surgical History  Past Surgical History:   Procedure Laterality Date     MOUTH SURGERY      wisdom teeth     ORTHOPEDIC SURGERY Left 2001    left patella resection     TONSILLECTOMY         Hx of Blood transfusions/reactions: none     Hx of abnormal bleeding or anti-platelet use: none    Steroid use in the last year: 3 courses of prednisone, last dose 1 week ago.  No long term steroids.      Personal or FH with difficulty with  Anesthesia:  none    Prior to Admission Medications  Current Outpatient Prescriptions   Medication Sig Dispense Refill     Multiple Vitamins-Minerals (AIRBORNE PO) Take by mouth as needed       Oxycodone-Acetaminophen (PERCOCET PO) Take by mouth 4 times daily as needed       SIMVASTATIN PO Take 20 mg by mouth At Bedtime       BuPROPion HCl (WELLBUTRIN PO) Take 150 mg by mouth 2 times daily          Allergies  Allergies   Allergen Reactions     Eggs [Chicken-Derived Products (Egg)] GI Disturbance       Social History  Social History     Social History     Marital status:      Spouse name: Ruth Chiang     Number of children: 2     Years of education: N/A     Occupational History      [employer]     Social History Main Topics     Smoking status: Never Smoker     Smokeless tobacco: Never Used     Alcohol use 6.0 oz/week     10 Glasses of wine per week     Drug use: No     Sexual activity: Not on file     Other Topics Concern     Not on file     Social History Narrative       Family History  Family History   Problem Relation Age of Onset     Breast Cancer Mother      Lung Cancer Mother      mets to the liver     Hyperlipidemia Mother      Brain Cancer Father      Hyperlipidemia Sister      Hyperlipidemia Brother      Coronary Artery Disease Brother      Myocardial Infarction Brother      Myocardial Infarction Maternal Grandfather      Heart Failure Paternal Half-Sister            ROS/MED HX  The complete review of systems is negative other than noted in the HPI or here.     ENT/Pulmonary:     (+)DIRK risk factors snores loudly, daytime somnolence, observed stopped breathing other ENT- left ear hearing loss, , . .   (-) tobacco use   Neurologic:     (+)other neuro bilateral lumbar radiculopathy    Cardiovascular:     (+) Dyslipidemia, ----. : . . . :. . No previous cardiac testing       METS/Exercise Tolerance:  >4 METS   Hematologic:  - neg hematologic  ROS      (-) history of blood clots  "and History of Transfusion   Musculoskeletal:   (+) , , other musculoskeletal- lumbar radiculopathy, stenosis and disc herniation = low back pain      GI/Hepatic:     (+) Other GI/Hepatic \"mild IBS\" - no meds      Renal/Genitourinary:  - ROS Renal section negative       Endo:  - neg endo ROS   (+) Obesity, .      Psychiatric:     (+) psychiatric history depression      Infectious Disease:  - neg infectious disease ROS       Malignancy:      - no malignancy   Other:    (+) no H/O Chronic Pain,                     Temp: 98.1  F (36.7  C) Temp src: Oral BP: 133/86 Pulse: 72   Resp: 18 SpO2: 98 %         236 lbs 14.4 oz  6' 0\"   Body mass index is 32.13 kg/(m^2).       Physical Exam  Constitutional: Awake, alert, cooperative, no apparent distress, and appears stated age.  Eyes: Pupils equal, round and reactive to light, extra ocular muscles intact, sclera clear, conjunctiva normal.  HENT: Normocephalic, oral pharynx with moist mucus membranes. Dentition - multiple missing teeth from childhood extractions. No goiter appreciated.   Respiratory: Clear to auscultation bilaterally, no crackles or wheezing.  Cardiovascular: Regular rate and rhythm, normal S1 and S2, and no murmur noted.  Carotids +2, no bruits. No edema. Palpable pulses to radial  DP and PT arteries.   GI: Normal bowel sounds, soft, non-distended, non-tender, no masses palpated, no hepatosplenomegaly.    Lymph/Hematologic: No cervical lymphadenopathy and no supraclavicular lymphadenopathy.  Genitourinary:  deferred  Skin: Warm and dry.  No rashes at anticipated surgical site.   Musculoskeletal: Full ROM of neck. There is no redness, warmth, or swelling of the exposed joints. Gross motor strength is normal.    Neurologic: Awake, alert, oriented to name, place and time. Cranial nerves II-XII are grossly intact.  Impaired gait.  Neuropsychiatric: Calm, cooperative. Normal affect.     Labs: (personally reviewed)   Component      Latest Ref Rng & Units 1/25/2018 "   WBC      4.0 - 11.0 10e9/L 8.8   RBC Count      4.4 - 5.9 10e12/L 4.97   Hemoglobin      13.3 - 17.7 g/dL 15.6   Hematocrit      40.0 - 53.0 % 47.2   MCV      78 - 100 fl 95   MCH      26.5 - 33.0 pg 31.4   MCHC      31.5 - 36.5 g/dL 33.1   RDW      10.0 - 15.0 % 12.4   Platelet Count      150 - 450 10e9/L 238   Diff Method       Automated Method   % Neutrophils      % 70.8   % Lymphocytes      % 15.2   % Monocytes      % 11.6   % Eosinophils      % 1.4   % Basophils      % 0.2   % Immature Granulocytes      % 0.8   Nucleated RBCs      0 /100 0   Absolute Neutrophil      1.6 - 8.3 10e9/L 6.2   Absolute Lymphocytes      0.8 - 5.3 10e9/L 1.3   Absolute Monocytes      0.0 - 1.3 10e9/L 1.0   Absolute Eosinophils      0.0 - 0.7 10e9/L 0.1   Absolute Basophils      0.0 - 0.2 10e9/L 0.0   Abs Immature Granulocytes      0 - 0.4 10e9/L 0.1   Absolute Nucleated RBC       0.0   Sodium      133 - 144 mmol/L 132 (L)   Potassium      3.4 - 5.3 mmol/L 4.7   Chloride      94 - 109 mmol/L 97   Carbon Dioxide      20 - 32 mmol/L 30   Anion Gap      3 - 14 mmol/L 5   Glucose      70 - 99 mg/dL 89   Urea Nitrogen      7 - 30 mg/dL 20   Creatinine      0.66 - 1.25 mg/dL 1.00   GFR Estimate      >60 mL/min/1.7m2 77   GFR Estimate If Black      >60 mL/min/1.7m2 >90   Calcium      8.5 - 10.1 mg/dL 9.0   INR      0.86 - 1.14 1.00   PTT      22 - 37 sec 25         ASSESSMENT and PLAN  Bud Chiang is a 57 year old male scheduled for an Open Left Lumbar 4-5 Hemilaminectomy And Microdiscectomy Possible Bilateral Approach by Dr. Tijerina in treatment of lumbar radiculopathy, stenosis, and disc herniation.  PAC referral for risk assessment and optimization for anesthesia with comorbid conditions of: obesity, depression, and hyperlipidemia.     Pre-operative considerations:  1.  Cardiac:  Functional status- METS >4.  He currently is unable to be very active due to his back injury, but last month prior to the injury he was going to the gym 4-5  days per week doing 30 mins of cardio and 30 mins of weight training with no cardiopulmonary complications.  Intermediate risk surgery with 0.4% risk of major adverse cardiac event.  Risk of MACE < 1%. METS>4.  No further cardiac evaluation needed per 2014 ACC/AHA guidelines for non-cardiac surgery.  2.  Pulm:  Airway feasible.  DIRK risk: high.  The patient and his wife report that they are pretty sure he has sleep apnea due to apneic episodes and loud snoring, but that he had requested a referral from his primary care provider in the past for a sleep study, but the request was denied.  A referral has been given today for the patient to arrange an appointment as soon as he is able for evaluation.  We discussed the risks of untreated sleep apnea at length.  Never smoker.  He notes that he started getting a mild, dry, tickling cough last night but has had no fevers or any other upper respiratory symptoms.   He is obese with a BMI >30.  3.  GI:  Risk of PONV score = 2.  If > 2, anti-emetic intervention recommended.  4. Endo:  He has had 3 short courses of prednisone in the past couple months for back pain.  He completed his last course of prednisone 1 week ago.  Stress dose steroids as per anesthesia DOS.  5. Musculoskeletal:  Consider fall risk precautions due to back pain with radiculopathy.  6. Psych:  Patient notes a considerable fear of needles and requests that anesthesia order something to help calm him in pre-op prior to IV insertion.   7.  FEN:  Sodium is slightly low at 132.        VTE risk: 0.5%    Patient is optimized and is acceptable candidate for the proposed procedure.  No further diagnostic evaluation is needed.     Patient also evaluated by Dr. Powers.           Lashonda Santiago, DNP, RN, APRN  Preoperative Assessment Center  [facility]  Clinic and Surgery Center  Phone: [phone]  Fax: [phone]

## 2018-01-25 NOTE — PATIENT INSTRUCTIONS
Preparing for Your Surgery      Name:  Bud Chiang   MRN:  [id number]   :  1960   Today's Date:  2018     Arriving for surgery:  Left lumbar Hemilaminectomy/Discectomy   Surgery date:  2017  Arrival time:  5:30 am  Please come to:       [facility] [address]  [address] [place]  [address]    -   parking is available in front of the hospital from 5:15 am to 8:00 pm    -  Stop at the Information Desk in the lobby    -   Inform the information person that you are here for surgery. An escort to 3c will be provided. If you would not like an escort, please proceed to 3C on the 3rd floor. [phone]     What can I eat or drink?  -  You may have solid food or milk products until 8 hours prior to your surgery.  Nothing after 11 pm   -  You may have water, apple juice or 7up/Sprite until 2 hours prior to your surgery.  Until 5:30 am arrival time     Which medicines can I take?  -  Do NOT take these medications in the morning, the day of surgery:      Hold aspirin, vitamins and supplements between now and surgery       -  Please take these medications the day of surgery:       Bupropion (Wellbutrin)        Oxycodone-Acetaminophen (Percocet) if needed       How do I prepare myself?  -  Take two showers: one the night before surgery; and one the morning of surgery.         Use Scrubcare or Hibiclens to wash from neck down.  You may use your own shampoo and conditioner. No other hair products.   -  Do NOT use lotion, powder, deodorant, or antiperspirant the day of your surgery.  -  Do NOT wear any makeup, fingernail polish or jewelry.  -Do not bring your own medications to the hospital, except for inhalers and eye drops.  -  Bring your ID and insurance card.    Questions or Concerns:  If you have questions or concerns, please call the  Preoperative Assessment Center, Monday-Friday 7AM-7PM:  [phone]          AFTER YOUR SURGERY  Breathing exercises    Breathing exercises help you recover faster. Take deep breaths and let the air out slowly. This will:     Help you wake up after surgery.    Help prevent complications like pneumonia.  Preventing complications will help you go home sooner.   Nausea and vomiting   You may feel sick to your stomach after surgery; if so, let your nurse know.    Pain control:  After surgery, you may have pain. Our goal is to help you manage your pain. Pain medicine will help you feel comfortable enough to do activities that will help you heal.  These activities may include breathing exercises, walking and physical therapy.   To help your health care team treat your pain we will ask: 1) If you have pain  2) where it is located 3) describe your pain in your words  Methods of pain control include medications given by mouth, vein or by nerve block for some surgeries.  We may give you a pain control pump that will:  1) Deliver the medicine through a tube placed in your vein  2) Control the amount of medicine you receive  3) Allow you to push a button to deliver a dose of pain medicine  Sequential Compression Device (SCD) or Pneumo Boots:  You may need to wear SCD S on your legs or feet. These are wraps connected to a machine that pumps in air and releases it. The repeated pumping helps prevent blood clots from forming.

## 2018-01-25 NOTE — LETTER
1/25/2018       RE: Bud Chiang  [address]  [place] [address]     Dear Colleague,    Thank you for referring your patient, Bud Chiang, to the [facility] NEUROSURGERY at [facility]. Please see a copy of my visit note below.        Neurosurgery Clinic Note    Chief Complaint: leg pain    History of Present Illness:  It was a pleasure to evaluate Bud Chiang in clinic today at the kind referral of Catarino Ponce MD  [address]  [place] [address].    Bud Chiang is a 57 year old male presenting with one month of bilateral left>right leg pain, radiating from buttock to posterolateral calf on left and buttock to heel on right, exacerbated by walking and moving/bending, relieved minorly by pain medication and oral steroids. Numbness and pain in both feet. Fell down stairs at [place] and saw PCP, was given oral steroids. Saw Dr. Ponce of Neurology on 1/24 and large L4-5 disc herniation seen, patient overbooked into my clinic for 1/25 and surgery planned for 1/26 due to large size of disc herniation.  No bowel bladder incontinence but increased frequency of urination noted.        Review of Systems   Constitutional: Negative for activity change, appetite change, chills, fatigue, fever and unexpected weight change.   HENT: Negative for trouble swallowing.    Eyes: Negative for visual disturbance.   Respiratory: Negative for shortness of breath.    Cardiovascular: Negative for leg swelling.  Gastrointestinal: Negative for abdominal pain, nausea and vomiting.   Endocrine: Negative for cold intolerance.  Genitourinary: Negative for incontinence, frequency and urgency.   Musculoskeletal: Positive for back pain and gait problem, negativeneck pain and neck stiffness.  Skin: Negative for color change  Allergic/Immunologic: Negative for immunocompromised state.  Neurological: Negative for tremors, speech difficulty, positive for weakness,  numbness in legs.   Hematological: Does not bruise/bleed easily.   Psychiatric/Behavioral: The patient is not nervous/anxious.     Past Medical History- hyperlipidemia  Past Surgical History- tonsillectomy, patellar repair      Social History     Social History     Marital status:      Spouse name: N/A     Number of children: N/A     Years of education: N/A     Social History Main Topics     Smoking status: Never Smoker     Smokeless tobacco: Never Used     Alcohol use None     Drug use: None     Sexual activity: Not Asked     Other Topics Concern     None     Social History Narrative       Family History- no known bleeding diatheses      IMAGING per my own measurement and interpretation:      MRI lumbar spine 1/22/18 with large L4-5 disc herniation slightly eccentric to left with severe central stenosis and effacement of CSF    Vitamin D:  Vitamin D Deficiency Screening Results:  No results found for: VITDT  No results found for: EED112, DHGC366, CLXS65MYLZP, VITD3, D2VIT, D3VIT, DTOT, [id number], [id number], [id number], [id number], [id number], [id number]      Nutritional Status:  Estimated body mass index is 32.14 kg/(m^2) as calculated from the following:    Height as of this encounter: 1.829 m (6').    Weight as of this encounter: 107.5 kg (236 lb 15.9 oz).      No results found for: ALBUMIN    Diabetes Screening:  No results found for: A1C    Nicotine Usage:  No                Physical Exam   /86 (BP Location: Right arm, Patient Position: Sitting, Cuff Size: Adult Large)  Pulse 72  Ht 1.829 m (6')  Wt 107.5 kg (236 lb 15.9 oz)  SpO2 98%  BMI 32.14 kg/m2  Constitutional: Oriented to person, place, and time. Appears well-developed and well-nourished. Cooperative. No distress.   HENT:   Head: Normocephalic and atraumatic.   Eyes: Conjunctivae are normal.  Neck: Normal range of motion. Neck supple. No tracheal deviation present.  Cardiovascular: Normal rate .    Pulmonary/Chest: Effort  normal.  Abdominal:  Exhibits no distension. There is no tenderness.   Musculoskeletal:   Cervical flexion-extension range of motion: normal  Lumbar flexion/extension range of motion: severely limited due to pain    Neurological: alert and oriented to person, place, and time. No cranial nerve deficit   Sensory deficit bilateral feet/soles    Reflex Scores:       Tricep reflexes are 2+ on the right side and 2+ on the left side.       Bicep reflexes are 2+ on the right side and 2+ on the left side.       Brachioradialis reflexes are 2+ on the right side and 2+ on the left side.       Patellar reflexes are 2+ on the right side and 2+ on the left side.       Achilles reflexes are 0+ on the right side and 0+ on the left side.    STRENGTH LEFT RIGHT   Deltoid 5 5   Bicep 5 5   Wrist Extensor 5 5   Tricep 5 5   Finger flexion 5 5   Finger abduction 5 5    5 5       Hip Flexion     5     5   Knee Extension 5 5   Ankle Dorsiflexion 4 5   Extensor Hallucis Longus 4 4   Plantar Flexion 5 5   Foot eversion 5 5   Foot inversion 5 5     No Lhermitte's, No Spurling's  No Ewelina's   No ankle clonus  Gait antalgic      Skin: Skin is warm, dry and intact.   Psychiatric: Normal mood and affect. Speech is normal and behavior is normal.        ASSESSMENT:  Bud Chiang is a 57 year old male with large L4-5 disc herniation with severe central stenosis, lumbar radiculopathy, and neurogenic claudication    PLAN:  Surgery 1/26/18 at 7:30am/[facility] for left L4-5 laminectomy, microdiskectomy possible bilateral    The patient will require flexion-extension and standing AP/lateral xrays to evaluate spinal alignment (ordered), preop labs (ordered), and PAC appointment (at 9am, 1/25/18); all of this has been coordinated by me to allow for expedited surgery tomorrow. I spoke with Dr. Ponce by phone to review this patient's presentation and expedite his care.    I discussed surgical risk including bleeding, infection, nerve root  damage, failure to heal, CSF leak, weakness/paralysis, numbness, worsening pain or failure to improve including neuropathic pain, recurrence of problem, and potential for development of instability and need for further procedures or surgeries. I discussed the risks of general anesthesia including stroke, heart attack, pneumonia, prolonged intubation, blood clot, pulmonary embolism, blindness, pressure ulcer or neuropathy, and urinary tract infection.     Patient understands and wishes to proceed.      Lilly Tijerina MD    [facility] Department of Neurosurgery  Complex Spinal Deformity, Scoliosis, and Minimally Invasive Spine Surgery Specialist  Office: [phone]    1/25/2018  7:23 AM        I spent 60 minutes in patient care with greater than 50% spent in counseling and/or coordination of care.    I performed independent visualization of radiographic imaging and entered my own interpretation, reviewed and/or ordered clinical laboratory tests, reviewed and/or ordered tests in radiology, reviewed and/or ordered medical tests, made the decision to obtain old records and/or history from someone other than the patient and Reviewed and summarized old records and/or discussed this case with another health care provider      Again, thank you for allowing me to participate in the care of your patient.      Sincerely,    Lilly Tijerina MD

## 2018-01-25 NOTE — MR AVS SNAPSHOT
After Visit Summary   1/25/2018    Bud Chiang    MRN: [id number]           Patient Information     Date Of Birth          1960        Visit Information        Provider Department      1/25/2018 7:00 AM Lilly Tijerina MD [facility] Neurosurgery        Today's Diagnoses     Lumbar stenosis with neurogenic claudication    -  1    Lumbar disc herniation with radiculopathy           Follow-ups after your visit        Your next 10 appointments already scheduled     Jan 25, 2018  9:00 AM CST   (Arrive by 8:45 AM)   PAC EVALUATION with  Pac Beto 8   [facility] Preoperative Assessment [place] ([facility])    [address] [place] [address]   [phone]            Jan 25, 2018 10:00 AM CST   (Arrive by 9:45 AM)   PAC RN ASSESSMENT with  Pac Rn   [facility] Preoperative Assessment [place] ([facility])    [address] [place] [address] [phone]            Jan 25, 2018 10:30 AM CST   (Arrive by 10:15 AM)   PAC Anesthesia Consult with  Pac Anesthesiologist   [facility] Preoperative Assessment [place] ([facility])    [address] [place] [id number] [phone]            Jan 26, 2018   Procedure with Lilly Tijerina MD   [facility], [place], Same Day Surgery (--)    [address] [facility] [address]   [phone]              Future tests that were ordered for you today     Open Future Orders        Priority Expected Expires Ordered    XR Lumbar Spine G/E 4 Views Routine 1/25/2018 1/25/2019 1/25/2018    CBC with platelets differential Routine  1/24/2019 1/24/2018    Basic metabolic panel Routine  1/24/2019 1/24/2018    INR Routine  1/24/2019 1/24/2018    Partial thromboplastin time Routine  1/24/2019 1/24/2018    Routine UA with micro reflex to culture Routine  1/24/2019 1/24/2018    X-ray Chest 2 vws* Routine 1/24/2018 1/24/2019  2018    ABO/Rh type and screen Routine  2019            Who to contact     Please call your clinic at [phone] to:    Ask questions about your health    Make or cancel appointments    Discuss your medicines    Learn about your test results    Speak to your doctor   If you have compliments or concerns about an experience at your clinic, or if you wish to file a complaint, please contact [facility] Patient Relations at [phone] or email us at [email or web address]         Additional Information About Your Visit        VerdezyneharOrthohub Information     T[employer] is an electronic gateway that provides easy, online access to your medical records. With T[employer] Kitchen, you can request a clinic appointment, read your test results, renew a prescription or communicate with your care team.     To sign up for T[employer] visit the website at [email or web address]   You will be asked to enter the access code listed below, as well as some personal information. Please follow the directions to create your username and password.     Your access code is: [email or web address]  Expires: 2018  6:30 AM     Your access code will  in 90 days. If you need help or a new code, please contact your [facility] or call [phone] for assistance.        Care EveryWhere ID     This is your Care EveryWhere ID. This could be used by other organizations to access your [facility] medical records  [id number]        Your Vitals Were     Pulse Height Pulse Oximetry BMI (Body Mass Index)          72 1.829 m (6') 98% 32.14 kg/m2         Blood Pressure from Last 3 Encounters:   18 133/86   18 144/89    Weight from Last 3 Encounters:   18 107.5 kg (236 lb 15.9 oz)   18 107.5 kg (237 lb 1.6 oz)               Primary Care Provider    None Specified       No primary provider on file.        Equal Access to Services     MILAN OLSEN AH: Marino alegria  Wilfred, alma stauffercarlos, deni kabuddy gabriel, iva ha leonardojosefa jeffersondeon denise. So [facility] [phone].    ATENCIÓN: Si habla blanca, tiene a sherman disposición servicios gratuitos de asistencia lingüística. Rex al [phone].    We comply with applicable federal civil rights laws and [place] laws. We do not discriminate on the basis of race, color, national origin, age, disability, sex, sexual orientation, or gender identity.            Thank you!     Thank you for choosing [facility]UROS[facility]ERY  for your care. Our goal is always to provide you with excellent care. Hearing back from our patients is one way we can continue to improve our services. Please take a few minutes to complete the written survey that you may receive in the mail after your visit with us. Thank you!             Your Updated Medication List - Protect others around you: Learn how to safely use, store and throw away your medicines at www.disposemymeds.org.          This list is accurate as of 1/25/18  7:33 AM.  Always use your most recent med list.                   Brand Name Dispense Instructions for use Diagnosis    PERCOCET PO      Take by mouth 4 times daily as needed        SIMVASTATIN PO      Take 20 mg by mouth At Bedtime        WELLBUTRIN PO      Take 150 mg by mouth

## 2018-01-25 NOTE — PROGRESS NOTES
Neurosurgery Clinic Note    Chief Complaint: leg pain    History of Present Illness:  It was a pleasure to evaluate Bud Chiang in clinic today at the kind referral of Catarino Ponce MD  [address] [facility]  [place] [address].    Bud Chiang is a 57 year old male presenting with one month of bilateral left>right leg pain, radiating from buttock to posterolateral calf on left and buttock to heel on right, exacerbated by walking and moving/bending, relieved minorly by pain medication and oral steroids. Numbness and pain in both feet. Fell down stairs at [place] and saw PCP, was given oral steroids. Saw Dr. Ponce of Neurology on 1/24 and large L4-5 disc herniation seen, patient overbooked into my clinic for 1/25 and surgery planned for 1/26 due to large size of disc herniation.  No bowel bladder incontinence but increased frequency of urination noted.        Review of Systems   Constitutional: Negative for activity change, appetite change, chills, fatigue, fever and unexpected weight change.   HENT: Negative for trouble swallowing.    Eyes: Negative for visual disturbance.   Respiratory: Negative for shortness of breath.    Cardiovascular: Negative for leg swelling.  Gastrointestinal: Negative for abdominal pain, nausea and vomiting.   Endocrine: Negative for cold intolerance.  Genitourinary: Negative for incontinence, frequency and urgency.   Musculoskeletal: Positive for back pain and gait problem, negativeneck pain and neck stiffness.  Skin: Negative for color change  Allergic/Immunologic: Negative for immunocompromised state.  Neurological: Negative for tremors, speech difficulty, positive for weakness, numbness in legs.   Hematological: Does not bruise/bleed easily.   Psychiatric/Behavioral: The patient is not nervous/anxious.     Past Medical History- hyperlipidemia  Past Surgical History- tonsillectomy, patellar repair      Social History     Social History     Marital status:       Spouse name: N/A     Number of children: N/A     Years of education: N/A     Social History Main Topics     Smoking status: Never Smoker     Smokeless tobacco: Never Used     Alcohol use None     Drug use: None     Sexual activity: Not Asked     Other Topics Concern     None     Social History Narrative       Family History- no known bleeding diatheses      IMAGING per my own measurement and interpretation:      MRI lumbar spine 1/22/18 with large L4-5 disc herniation slightly eccentric to left with severe central stenosis and effacement of CSF    Vitamin D:  Vitamin D Deficiency Screening Results:  No results found for: VITDT  No results found for: CBP938, PVTR797, OVCS31YMCVV, VITD3, D2VIT, D3VIT, DTOT, [id number], [id number], [id number], [id number], [id number], [id number]      Nutritional Status:  Estimated body mass index is 32.14 kg/(m^2) as calculated from the following:    Height as of this encounter: 1.829 m (6').    Weight as of this encounter: 107.5 kg (236 lb 15.9 oz).      No results found for: ALBUMIN    Diabetes Screening:  No results found for: A1C    Nicotine Usage:  No                Physical Exam   /86 (BP Location: Right arm, Patient Position: Sitting, Cuff Size: Adult Large)  Pulse 72  Ht 1.829 m (6')  Wt 107.5 kg (236 lb 15.9 oz)  SpO2 98%  BMI 32.14 kg/m2  Constitutional: Oriented to person, place, and time. Appears well-developed and well-nourished. Cooperative. No distress.   HENT:   Head: Normocephalic and atraumatic.   Eyes: Conjunctivae are normal.  Neck: Normal range of motion. Neck supple. No tracheal deviation present.  Cardiovascular: Normal rate .    Pulmonary/Chest: Effort normal.  Abdominal:  Exhibits no distension. There is no tenderness.   Musculoskeletal:   Cervical flexion-extension range of motion: normal  Lumbar flexion/extension range of motion: severely limited due to pain    Neurological: alert and oriented to person, place, and time. No cranial nerve  deficit   Sensory deficit bilateral feet/soles    Reflex Scores:       Tricep reflexes are 2+ on the right side and 2+ on the left side.       Bicep reflexes are 2+ on the right side and 2+ on the left side.       Brachioradialis reflexes are 2+ on the right side and 2+ on the left side.       Patellar reflexes are 2+ on the right side and 2+ on the left side.       Achilles reflexes are 0+ on the right side and 0+ on the left side.    STRENGTH LEFT RIGHT   Deltoid 5 5   Bicep 5 5   Wrist Extensor 5 5   Tricep 5 5   Finger flexion 5 5   Finger abduction 5 5    5 5       Hip Flexion     5     5   Knee Extension 5 5   Ankle Dorsiflexion 4 5   Extensor Hallucis Longus 4 4   Plantar Flexion 5 5   Foot eversion 5 5   Foot inversion 5 5     No Lhermitte's, No Spurling's  No Ewelina's   No ankle clonus  Gait antalgic      Skin: Skin is warm, dry and intact.   Psychiatric: Normal mood and affect. Speech is normal and behavior is normal.        ASSESSMENT:  Bud Chiang is a 57 year old male with large L4-5 disc herniation with severe central stenosis, lumbar radiculopathy, and neurogenic claudication    PLAN:  Surgery 1/26/18 at 7:30am/[facility] for left L4-5 laminectomy, microdiskectomy possible bilateral    The patient will require flexion-extension and standing AP/lateral xrays to evaluate spinal alignment (ordered), preop labs (ordered), and PAC appointment (at 9am, 1/25/18); all of this has been coordinated by me to allow for expedited surgery tomorrow. I spoke with Dr. Ponce by phone to review this patient's presentation and expedite his care.    I discussed surgical risk including bleeding, infection, nerve root damage, failure to heal, CSF leak, weakness/paralysis, numbness, worsening pain or failure to improve including neuropathic pain, recurrence of problem, and potential for development of instability and need for further procedures or surgeries. I discussed the risks of general anesthesia  including stroke, heart attack, pneumonia, prolonged intubation, blood clot, pulmonary embolism, blindness, pressure ulcer or neuropathy, and urinary tract infection.     Patient understands and wishes to proceed.      Lilly Tijerina MD    [facility] Department of Neurosurgery  Complex Spinal Deformity, Scoliosis, and Minimally Invasive Spine Surgery Specialist  Office: [phone]    1/25/2018  7:23 AM        I spent 60 minutes in patient care with greater than 50% spent in counseling and/or coordination of care.    I performed independent visualization of radiographic imaging and entered my own interpretation, reviewed and/or ordered clinical laboratory tests, reviewed and/or ordered tests in radiology, reviewed and/or ordered medical tests, made the decision to obtain old records and/or history from someone other than the patient and Reviewed and summarized old records and/or discussed this case with another health care provider    Answers for HPI/ROS submitted by the patient on 1/25/2018   General Symptoms: Yes  Skin Symptoms: No  HENT Symptoms: No  EYE SYMPTOMS: No  HEART SYMPTOMS: No  LUNG SYMPTOMS: Yes  INTESTINAL SYMPTOMS: No  URINARY SYMPTOMS: Yes  REPRODUCTIVE SYMPTOMS: Yes  SKELETAL SYMPTOMS: Yes  BLOOD SYMPTOMS: No  NERVOUS SYSTEM SYMPTOMS: Yes  MENTAL HEALTH SYMPTOMS: Yes  Fever: Yes  Loss of appetite: No  Weight loss: No  Weight gain: Yes  Fatigue: Yes  Night sweats: No  Chills: No  Increased stress: Yes  Excessive hunger: No  Excessive thirst: No  Feeling hot or cold when others believe the temperature is normal: Yes  Loss of height: No  Post-operative complications: No  Surgical site pain: Yes  Hallucinations: No  Change in or Loss of Energy: Yes  Hyperactivity: No  Confusion: No  Cough: Yes  Sputum or phlegm: Yes  Coughing up blood: No  Difficulty breating or shortness of breath: No  Snoring: Yes  Wheezing: No  Difficulty breathing on exertion: No  Nighttime Cough:  Yes  Difficulty breathing when lying flat: No  Trouble holding urine or incontinence: Yes  Pain or burning: No  Trouble starting or stopping: Yes  Increased frequency of urination: Yes  Blood in urine: No  Decreased frequency of urination: No  Frequent nighttime urination: Yes  Flank pain: No  Difficulty emptying bladder: Yes  Back pain: Yes  Muscle aches: Yes  Neck pain: No  Swollen joints: No  Joint pain: No  Bone pain: Yes  Muscle cramps: Yes  Muscle weakness: Yes  Joint stiffness: Yes  Bone fracture: No  Trouble with coordination: Yes  Dizziness or trouble with balance: Yes  Fainting or black-out spells: No  Memory loss: No  Headache: No  Seizures: No  Speech problems: No  Tingling: Yes  Tremor: No  Weakness: Yes  Difficulty walking: Yes  Paralysis: No  Numbness: Yes  Scrotal pain or swelling: No  Erectile dysfunction: Yes  Penile discharge: No  Genital ulcers: No  Reduced libido: Yes  Nervous or Anxious: Yes  Depression: Yes  Trouble sleeping: Yes  Trouble thinking or concentrating: Yes  Mood changes: No  Panic attacks: No

## 2018-01-26 ENCOUNTER — HOSPITAL ENCOUNTER (OUTPATIENT)
Facility: CLINIC | Age: 58
Discharge: HOME | End: 2018-01-26
Attending: NEUROLOGICAL SURGERY | Admitting: NEUROLOGICAL SURGERY
Payer: COMMERCIAL

## 2018-01-26 ENCOUNTER — SURGERY (OUTPATIENT)
Age: 58
End: 2018-01-26

## 2018-01-26 ENCOUNTER — APPOINTMENT (OUTPATIENT)
Dept: GENERAL RADIOLOGY | Facility: CLINIC | Age: 58
End: 2018-01-26
Attending: NEUROLOGICAL SURGERY
Payer: COMMERCIAL

## 2018-01-26 ENCOUNTER — ANESTHESIA (OUTPATIENT)
Dept: SURGERY | Facility: CLINIC | Age: 58
End: 2018-01-26
Payer: COMMERCIAL

## 2018-01-26 VITALS
SYSTOLIC BLOOD PRESSURE: 133 MMHG | RESPIRATION RATE: 16 BRPM | OXYGEN SATURATION: 93 % | BODY MASS INDEX: 31.77 KG/M2 | HEIGHT: 72 IN | WEIGHT: 234.57 LBS | DIASTOLIC BLOOD PRESSURE: 84 MMHG | TEMPERATURE: 98.4 F

## 2018-01-26 DIAGNOSIS — M48.062 LUMBAR STENOSIS WITH NEUROGENIC CLAUDICATION: Primary | ICD-10-CM

## 2018-01-26 LAB
ALBUMIN UR-MCNC: NEGATIVE MG/DL
APPEARANCE UR: CLEAR
BILIRUB UR QL STRIP: NEGATIVE
COLOR UR AUTO: YELLOW
GLUCOSE BLDC GLUCOMTR-MCNC: 82 MG/DL (ref 70–99)
GLUCOSE UR STRIP-MCNC: NEGATIVE MG/DL
HGB UR QL STRIP: NEGATIVE
KETONES UR STRIP-MCNC: NEGATIVE MG/DL
LEUKOCYTE ESTERASE UR QL STRIP: NEGATIVE
MUCOUS THREADS #/AREA URNS LPF: PRESENT /LPF
NITRATE UR QL: NEGATIVE
PH UR STRIP: 5.5 PH (ref 5–7)
RBC #/AREA URNS AUTO: 1 /HPF (ref 0–2)
SOURCE: ABNORMAL
SP GR UR STRIP: 1.02 (ref 1–1.03)
UROBILINOGEN UR STRIP-MCNC: NORMAL MG/DL (ref 0–2)
WBC #/AREA URNS AUTO: <1 /HPF (ref 0–2)

## 2018-01-26 PROCEDURE — 40000277 XR SURGERY CARM FLUORO LESS THAN 5 MIN W STILLS: Mod: TC

## 2018-01-26 PROCEDURE — C9399 UNCLASSIFIED DRUGS OR BIOLOG: HCPCS | Performed by: NURSE ANESTHETIST, CERTIFIED REGISTERED

## 2018-01-26 PROCEDURE — 25000132 ZZH RX MED GY IP 250 OP 250 PS 637: Performed by: ANESTHESIOLOGY

## 2018-01-26 PROCEDURE — 25000132 ZZH RX MED GY IP 250 OP 250 PS 637: Performed by: STUDENT IN AN ORGANIZED HEALTH CARE EDUCATION/TRAINING PROGRAM

## 2018-01-26 PROCEDURE — 25000125 ZZHC RX 250: Performed by: NURSE ANESTHETIST, CERTIFIED REGISTERED

## 2018-01-26 PROCEDURE — 25000128 H RX IP 250 OP 636: Performed by: NEUROLOGICAL SURGERY

## 2018-01-26 PROCEDURE — 37000009 ZZH ANESTHESIA TECHNICAL FEE, EACH ADDTL 15 MIN: Performed by: NEUROLOGICAL SURGERY

## 2018-01-26 PROCEDURE — 25000125 ZZHC RX 250: Performed by: NEUROLOGICAL SURGERY

## 2018-01-26 PROCEDURE — 27210995 ZZH RX 272: Performed by: NEUROLOGICAL SURGERY

## 2018-01-26 PROCEDURE — 36000064 ZZH SURGERY LEVEL 4 EA 15 ADDTL MIN - UMMC: Performed by: NEUROLOGICAL SURGERY

## 2018-01-26 PROCEDURE — 25000128 H RX IP 250 OP 636: Performed by: NURSE ANESTHETIST, CERTIFIED REGISTERED

## 2018-01-26 PROCEDURE — 27210794 ZZH OR GENERAL SUPPLY STERILE: Performed by: NEUROLOGICAL SURGERY

## 2018-01-26 PROCEDURE — 36000066 ZZH SURGERY LEVEL 4 W FLUORO 1ST 30 MIN - UMMC: Performed by: NEUROLOGICAL SURGERY

## 2018-01-26 PROCEDURE — P9041 ALBUMIN (HUMAN),5%, 50ML: HCPCS | Performed by: NURSE ANESTHETIST, CERTIFIED REGISTERED

## 2018-01-26 PROCEDURE — 25000128 H RX IP 250 OP 636: Performed by: ANESTHESIOLOGY

## 2018-01-26 PROCEDURE — 71000027 ZZH RECOVERY PHASE 2 EACH 15 MINS: Performed by: NEUROLOGICAL SURGERY

## 2018-01-26 PROCEDURE — 71000015 ZZH RECOVERY PHASE 1 LEVEL 2 EA ADDTL HR: Performed by: NEUROLOGICAL SURGERY

## 2018-01-26 PROCEDURE — 82962 GLUCOSE BLOOD TEST: CPT

## 2018-01-26 PROCEDURE — 40000170 ZZH STATISTIC PRE-PROCEDURE ASSESSMENT II: Performed by: NEUROLOGICAL SURGERY

## 2018-01-26 PROCEDURE — 71000014 ZZH RECOVERY PHASE 1 LEVEL 2 FIRST HR: Performed by: NEUROLOGICAL SURGERY

## 2018-01-26 PROCEDURE — 25000565 ZZH ISOFLURANE, EA 15 MIN: Performed by: NEUROLOGICAL SURGERY

## 2018-01-26 PROCEDURE — 37000008 ZZH ANESTHESIA TECHNICAL FEE, 1ST 30 MIN: Performed by: NEUROLOGICAL SURGERY

## 2018-01-26 RX ORDER — ONDANSETRON 4 MG/1
4 TABLET, ORALLY DISINTEGRATING ORAL EVERY 30 MIN PRN
Status: DISCONTINUED | OUTPATIENT
Start: 2018-01-26 | End: 2018-01-26 | Stop reason: HOSPADM

## 2018-01-26 RX ORDER — BUPIVACAINE HYDROCHLORIDE AND EPINEPHRINE 5; 5 MG/ML; UG/ML
INJECTION, SOLUTION PERINEURAL PRN
Status: DISCONTINUED | OUTPATIENT
Start: 2018-01-26 | End: 2018-01-26 | Stop reason: HOSPADM

## 2018-01-26 RX ORDER — POLYETHYLENE GLYCOL 3350 17 G/17G
1 POWDER, FOR SOLUTION ORAL 2 TIMES DAILY
Qty: 500 G | Refills: 3 | Status: SHIPPED | OUTPATIENT
Start: 2018-01-26 | End: 2018-02-09

## 2018-01-26 RX ORDER — FENTANYL CITRATE 50 UG/ML
25-50 INJECTION, SOLUTION INTRAMUSCULAR; INTRAVENOUS
Status: DISCONTINUED | OUTPATIENT
Start: 2018-01-26 | End: 2018-01-26 | Stop reason: HOSPADM

## 2018-01-26 RX ORDER — CEFAZOLIN SODIUM 2 G/100ML
2 INJECTION, SOLUTION INTRAVENOUS
Status: COMPLETED | OUTPATIENT
Start: 2018-01-26 | End: 2018-01-26

## 2018-01-26 RX ORDER — SODIUM CHLORIDE, SODIUM LACTATE, POTASSIUM CHLORIDE, CALCIUM CHLORIDE 600; 310; 30; 20 MG/100ML; MG/100ML; MG/100ML; MG/100ML
INJECTION, SOLUTION INTRAVENOUS CONTINUOUS
Status: DISCONTINUED | OUTPATIENT
Start: 2018-01-26 | End: 2018-01-26 | Stop reason: HOSPADM

## 2018-01-26 RX ORDER — ALBUMIN, HUMAN INJ 5% 5 %
SOLUTION INTRAVENOUS CONTINUOUS PRN
Status: DISCONTINUED | OUTPATIENT
Start: 2018-01-26 | End: 2018-01-26

## 2018-01-26 RX ORDER — SODIUM CHLORIDE 9 MG/ML
INJECTION, SOLUTION INTRAVENOUS CONTINUOUS PRN
Status: DISCONTINUED | OUTPATIENT
Start: 2018-01-26 | End: 2018-01-26

## 2018-01-26 RX ORDER — OXYCODONE AND ACETAMINOPHEN 5; 325 MG/1; MG/1
1 TABLET ORAL ONCE
Status: COMPLETED | OUTPATIENT
Start: 2018-01-26 | End: 2018-01-26

## 2018-01-26 RX ORDER — VANCOMYCIN HYDROCHLORIDE 1 G/20ML
INJECTION, POWDER, LYOPHILIZED, FOR SOLUTION INTRAVENOUS PRN
Status: DISCONTINUED | OUTPATIENT
Start: 2018-01-26 | End: 2018-01-26 | Stop reason: HOSPADM

## 2018-01-26 RX ORDER — NALOXONE HYDROCHLORIDE 0.4 MG/ML
.1-.4 INJECTION, SOLUTION INTRAMUSCULAR; INTRAVENOUS; SUBCUTANEOUS
Status: DISCONTINUED | OUTPATIENT
Start: 2018-01-26 | End: 2018-01-26 | Stop reason: HOSPADM

## 2018-01-26 RX ORDER — AMOXICILLIN 250 MG
1 CAPSULE ORAL 2 TIMES DAILY
Qty: 60 TABLET | Refills: 1 | Status: SHIPPED | OUTPATIENT
Start: 2018-01-26 | End: 2018-02-09

## 2018-01-26 RX ORDER — DEXAMETHASONE SODIUM PHOSPHATE 4 MG/ML
INJECTION, SOLUTION INTRA-ARTICULAR; INTRALESIONAL; INTRAMUSCULAR; INTRAVENOUS; SOFT TISSUE PRN
Status: DISCONTINUED | OUTPATIENT
Start: 2018-01-26 | End: 2018-01-26

## 2018-01-26 RX ORDER — HYDRALAZINE HYDROCHLORIDE 20 MG/ML
2.5-5 INJECTION INTRAMUSCULAR; INTRAVENOUS EVERY 10 MIN PRN
Status: DISCONTINUED | OUTPATIENT
Start: 2018-01-26 | End: 2018-01-26 | Stop reason: HOSPADM

## 2018-01-26 RX ORDER — LABETALOL HYDROCHLORIDE 5 MG/ML
10 INJECTION, SOLUTION INTRAVENOUS
Status: DISCONTINUED | OUTPATIENT
Start: 2018-01-26 | End: 2018-01-26 | Stop reason: HOSPADM

## 2018-01-26 RX ORDER — ACETAMINOPHEN 325 MG/1
975 TABLET ORAL ONCE
Status: COMPLETED | OUTPATIENT
Start: 2018-01-26 | End: 2018-01-26

## 2018-01-26 RX ORDER — CEFAZOLIN SODIUM 1 G/3ML
1 INJECTION, POWDER, FOR SOLUTION INTRAMUSCULAR; INTRAVENOUS SEE ADMIN INSTRUCTIONS
Status: DISCONTINUED | OUTPATIENT
Start: 2018-01-26 | End: 2018-01-26 | Stop reason: HOSPADM

## 2018-01-26 RX ORDER — BISACODYL 10 MG
10 SUPPOSITORY, RECTAL RECTAL ONCE
Status: COMPLETED | OUTPATIENT
Start: 2018-01-26 | End: 2018-01-26

## 2018-01-26 RX ORDER — ONDANSETRON 2 MG/ML
INJECTION INTRAMUSCULAR; INTRAVENOUS PRN
Status: DISCONTINUED | OUTPATIENT
Start: 2018-01-26 | End: 2018-01-26

## 2018-01-26 RX ORDER — PROPOFOL 10 MG/ML
INJECTION, EMULSION INTRAVENOUS PRN
Status: DISCONTINUED | OUTPATIENT
Start: 2018-01-26 | End: 2018-01-26

## 2018-01-26 RX ORDER — ONDANSETRON 2 MG/ML
4 INJECTION INTRAMUSCULAR; INTRAVENOUS EVERY 30 MIN PRN
Status: DISCONTINUED | OUTPATIENT
Start: 2018-01-26 | End: 2018-01-26 | Stop reason: HOSPADM

## 2018-01-26 RX ORDER — OXYCODONE AND ACETAMINOPHEN 5; 325 MG/1; MG/1
1-2 TABLET ORAL 4 TIMES DAILY PRN
Qty: 50 TABLET | Refills: 0 | Status: SHIPPED | OUTPATIENT
Start: 2018-01-26 | End: 2018-02-09

## 2018-01-26 RX ORDER — EPHEDRINE SULFATE 50 MG/ML
INJECTION, SOLUTION INTRAMUSCULAR; INTRAVENOUS; SUBCUTANEOUS PRN
Status: DISCONTINUED | OUTPATIENT
Start: 2018-01-26 | End: 2018-01-26

## 2018-01-26 RX ORDER — FENTANYL CITRATE 50 UG/ML
INJECTION, SOLUTION INTRAMUSCULAR; INTRAVENOUS PRN
Status: DISCONTINUED | OUTPATIENT
Start: 2018-01-26 | End: 2018-01-26

## 2018-01-26 RX ADMIN — THROMBIN, TOPICAL (BOVINE) 5000 UNITS: KIT at 08:39

## 2018-01-26 RX ADMIN — DEXAMETHASONE SODIUM PHOSPHATE 10 MG: 4 INJECTION, SOLUTION INTRA-ARTICULAR; INTRALESIONAL; INTRAMUSCULAR; INTRAVENOUS; SOFT TISSUE at 07:45

## 2018-01-26 RX ADMIN — CEFAZOLIN SODIUM 2 G: 2 INJECTION, SOLUTION INTRAVENOUS at 07:55

## 2018-01-26 RX ADMIN — MIDAZOLAM 2 MG: 1 INJECTION INTRAMUSCULAR; INTRAVENOUS at 07:17

## 2018-01-26 RX ADMIN — BISACODYL 10 MG: 10 SUPPOSITORY RECTAL at 11:44

## 2018-01-26 RX ADMIN — FENTANYL CITRATE 50 MCG: 50 INJECTION, SOLUTION INTRAMUSCULAR; INTRAVENOUS at 07:23

## 2018-01-26 RX ADMIN — HYDROMORPHONE HYDROCHLORIDE 0.5 MG: 1 INJECTION, SOLUTION INTRAMUSCULAR; INTRAVENOUS; SUBCUTANEOUS at 10:52

## 2018-01-26 RX ADMIN — SUGAMMADEX 200 MG: 100 INJECTION, SOLUTION INTRAVENOUS at 09:39

## 2018-01-26 RX ADMIN — Medication 1 EACH: at 08:38

## 2018-01-26 RX ADMIN — FENTANYL CITRATE 50 MCG: 50 INJECTION, SOLUTION INTRAMUSCULAR; INTRAVENOUS at 07:28

## 2018-01-26 RX ADMIN — SODIUM CHLORIDE: 9 INJECTION, SOLUTION INTRAVENOUS at 07:17

## 2018-01-26 RX ADMIN — ONDANSETRON 4 MG: 2 INJECTION INTRAMUSCULAR; INTRAVENOUS at 09:12

## 2018-01-26 RX ADMIN — FENTANYL CITRATE 50 MCG: 50 INJECTION, SOLUTION INTRAMUSCULAR; INTRAVENOUS at 08:35

## 2018-01-26 RX ADMIN — Medication 5 MG: at 09:01

## 2018-01-26 RX ADMIN — ROCURONIUM BROMIDE 50 MG: 10 INJECTION INTRAVENOUS at 07:28

## 2018-01-26 RX ADMIN — ROCURONIUM BROMIDE 10 MG: 10 INJECTION INTRAVENOUS at 08:30

## 2018-01-26 RX ADMIN — Medication 1 KIT: at 08:39

## 2018-01-26 RX ADMIN — OXYCODONE HYDROCHLORIDE AND ACETAMINOPHEN 1 TABLET: 5; 325 TABLET ORAL at 13:32

## 2018-01-26 RX ADMIN — HYDROMORPHONE HYDROCHLORIDE 0.5 MG: 1 INJECTION, SOLUTION INTRAMUSCULAR; INTRAVENOUS; SUBCUTANEOUS at 09:16

## 2018-01-26 RX ADMIN — BUPIVACAINE HYDROCHLORIDE AND EPINEPHRINE BITARTRATE 20 ML: 5; .005 INJECTION, SOLUTION EPIDURAL; INTRACAUDAL; PERINEURAL at 09:44

## 2018-01-26 RX ADMIN — FENTANYL CITRATE 50 MCG: 50 INJECTION, SOLUTION INTRAMUSCULAR; INTRAVENOUS at 07:59

## 2018-01-26 RX ADMIN — ALBUMIN HUMAN: 0.05 INJECTION, SOLUTION INTRAVENOUS at 08:28

## 2018-01-26 RX ADMIN — SODIUM CHLORIDE 600 ML: 900 IRRIGANT IRRIGATION at 09:17

## 2018-01-26 RX ADMIN — FENTANYL CITRATE 50 MCG: 50 INJECTION, SOLUTION INTRAMUSCULAR; INTRAVENOUS at 07:43

## 2018-01-26 RX ADMIN — VANCOMYCIN HYDROCHLORIDE 500 MG: 1 INJECTION, POWDER, LYOPHILIZED, FOR SOLUTION INTRAVENOUS at 09:10

## 2018-01-26 RX ADMIN — ROCURONIUM BROMIDE 10 MG: 10 INJECTION INTRAVENOUS at 07:55

## 2018-01-26 RX ADMIN — ACETAMINOPHEN 975 MG: 325 TABLET, FILM COATED ORAL at 06:10

## 2018-01-26 RX ADMIN — PROPOFOL 200 MG: 10 INJECTION, EMULSION INTRAVENOUS at 07:28

## 2018-01-26 NOTE — PROGRESS NOTES
Service Date: 2018      HISTORY OF PRESENT ILLNESS:  Mr. Chiang was seen today at the request of Dr. Ponce because of very large lumbar disk extrusion.  A detailed summary has been entered into the record by my resident, Syed Dumas, which I have reviewed, amended and incorporated into my own.      The primary reason for seeing Mr. Chiang today was to be sure that it would be reasonably safe for him to wait to see Dr. Lilly Tijerina in the Spinal Neurosurgery division tomorrow.      ASESSMENT:  Our examination of Mr. Chiang and review of his history suggests that while he has significant discomfort and some objective findings related to his disk herniation, he does not have signs or symptoms that suggest a requirement for urgent surgical intervention; and therefore, it is reasonable to defer further neurosurgical evaluation until we can see Dr. Tijerina tomorrow morning.      PLAN:  We did discuss signs and symptoms that if they occurred tonight should prompt him to come to the hospital and call us so that further evaluation could be done urgently if necessary.      We have spoken with Dr. Tijerina and made arrangements for her to see him tomorrow.         DA RODRIGUEZ MD             D: 2018   T: 2018   MT: [id number]      Name:     GAUDENCIO CHIANG   MRN:      [id number]        Account:      [id number]   :      1960           Service Date: 2018      Document: [id number]

## 2018-01-26 NOTE — IP AVS SNAPSHOT
Post Anesthesia Care Unit [address] [address]    Phone:  [phone]                                       After Visit Summary   1/26/2018    Bud Chiang    MRN: [id number]           After Visit Summary Signature Page     I have received my discharge instructions, and my questions have been answered. I have discussed any challenges I see with this plan with the nurse or doctor.    ..........................................................................................................................................  Patient/Patient Representative Signature      ..........................................................................................................................................  Patient Representative Print Name and Relationship to Patient    ..................................................               ................................................  Date                                            Time    ..........................................................................................................................................  Reviewed by Signature/Title    ...................................................              ..............................................  Date                                                            Time

## 2018-01-26 NOTE — ANESTHESIA CARE TRANSFER NOTE
Patient: Bud Chiang    Procedure(s):  Open Left Lumbar 4-5 Hemilaminectomy And Microdiscectomy,  Bilateral Approach - Wound Class: I-Clean    Diagnosis: Lumbar Radiculopathy  Diagnosis Additional Information: No value filed.    Anesthesia Type:   No value filed.     Note:  Airway :Nasal Cannula  Patient transferred to:PACU  Handoff Report: Identifed the Patient, Identified the Reponsible Provider, Reviewed the pertinent medical history, Discussed the surgical course, Reviewed Intra-OP anesthesia mangement and issues during anesthesia, Set expectations for post-procedure period and Allowed opportunity for questions and acknowledgement of understanding      Vitals: (Last set prior to Anesthesia Care Transfer)    CRNA VITALS  1/26/2018 0936 - 1/26/2018 1036      1/26/2018             NIBP: (!)  140/91    Ht Rate: 87    Temp: 36.2  C (97.2  F)    SpO2: 99 %                Electronically Signed By: Ulises Sen MD  January 26, 2018  3:46 PM

## 2018-01-26 NOTE — OP NOTE
Pre-operative diagnosis:                   Lumbar Radiculopathy, lumbar stenosis  Post-operative diagnosis                  same  Procedure:                Procedure(s):  Open BILATERAL Lumbar 4-5 Hemilaminectomy And Microdiscectomy,  Bilateral Approach - Wound Class: I-Clean  Surgeon:                   Surgeon(s) and Role:     * Lilly Tijerina MD - Primary     * Royer Pollard MD - Resident - Assisting  Anesthesia:               General                       Estimated blood loss:            Less than 10 ml      INDICATIONS FOR SURGERY:  Bud Chiang is a 57 year old male with severe stenosis at L4-5 due to very large disc herniation with cephalad migration. Disc is occupying over 80% of canal volume and complete effacement of CSF exists with severe cauda equina stenosis. Patient has severe low back pain and severe bilateral radicular leg pain, as well as difficulty initiating urinary stream. Therefore he was taken to surgery urgently within 24 hours of my initial consultation with him in my clinic.  Because the disc herniation had a large central component, I discussed a midline open approach to enable bilateral microdiskectomy to ensure complete removal and decompression.     I discussed risks versus benefits of operative intervention including:   Bleeding, infection, nerve root damage, numbness, weakness, CSF leak, failure to heal, development of facet instability with subsequent need for fusion, disc reherniation, failure to improve, and need for further surgery. The patient understood and elected to proceed.    OPERATIVE COURSE AND INTRAOPERATIVE FINDINGS:  The patient was identified and informed consent was verified.      The patient was taken to the operating room where general endotracheal anesthesia was induced.  Perioperative antibiotics were administered within 1 hour of skin incision.  The patient was positioned prone on the Hieu frame and all extremity points were appropriately  padded.  The Hieu frame was elevated to create lumbar flexion to widen the lumbar intralaminar space    The patient was prepped and draped in the standard sterile fashion.  The fluoroscopy was draped and brought into the field.  An intraoperative timeout was taken to correctly identify patient and procedure. The fluoroscope was used to localize correct level for skin incision.  The area of planned skin incision was infiltrated with 10 mL of 0.5% Marcaine with epinephrine.   A 10 blade scalpel was used to make midline skin incision.  Monopolar electrocautery was used to dissect the subcutaneous tissues and to open the lumbar fascia in the midline.      Due to the bilateral nature of the disc herniation with large central component, a bilateral laminectomy at L4-5 was performed. Subperiosteal dissection was used to expose the L4-5 posterior elements, with fluoroscopy used to confirm correct level for surgery prior to bony decompression. The microscope was then brought into the field. The L4 spinous process was resected with a Leksell rongeur and the drill was used to perform bilateral L4-5 laminectomy and partial medial facetectomy was completed with Kerrison rongeurs followed by ligamentum flavum resection to allow for lateral recess exposure for disc space exploration. We removed the minimal amount of bone possible in order to attempt to avoid destabilizing the patient. Disc space exploration was begun on the left side with microdiskectomy, where a large disc extrusion was visualized and removed en-bloc with a pituitary. Then the right side L4-5 disc space was explored and small remaining disc bulge was incised with a 15 blade scalpel and resected with a pituitary rongeur. A Gates was used to verify that the thecal sac was well-decompressed centrally and in the lateral recesses without any remaining stenosis.     Hemostasis was verified with the assistance of bone wax, and FloSeal and thrombin soaked gelfoam  which were removed prior to closure.    The incision was irrigated and 500mg of Vancomycin powder was placed in the incision.    The incision was then closed using 0 Vicryl interrupted suture in the fascia followed by 0-Vicryl running suture in the fascia followed by 2-0 Vicryl inverted interrupted sutures in the subcutaneous layer then 2-0 Vicryl running suture in the subcutaneous layer, followed by 4-0 Monocryl subcuticular stitch on the skin, Dermabond, and a sterile dressing.    All sponge and needle counts are correct at the conclusion of closure.    There were no intraoperative complications.    The patient was taken to the PACU in stable condition. Because of his history of sleep apnea, he will be observed overnight. We will monitor for urinary retention due to his preoperative symptoms.    I performed the procedure personally from skin incision through fascial closure, and the assistance of Dr. Pollard was utilized for skin closure, for which I was immediately available.    Lilly Tijerina MD    [facility] Department of Neurosurgery  Office: [phone]    1/26/2018  10:20 AM

## 2018-01-26 NOTE — OR NURSING
Dr. Pollard came to bedside to see pt two times to discuss goals for discharge. Pt has a wedding tomorrow evening for his step-daughter  which he wants to attend. He must void before discharge with PVR less than 200 cc, and must receive a suppository before leaving. He has had minimal pain, telling me that his pain before surgery was agonizing and this discomfort is negligible.

## 2018-01-26 NOTE — ANESTHESIA POSTPROCEDURE EVALUATION
Patient: Bud Chiang    Procedure(s):  Open Left Lumbar 4-5 Hemilaminectomy And Microdiscectomy,  Bilateral Approach - Wound Class: I-Clean    Diagnosis:Lumbar Radiculopathy  Diagnosis Additional Information: No value filed.    Anesthesia Type:  No value filed.    Note:  Anesthesia Post Evaluation    Patient location during evaluation: PACU  Patient participation: Able to fully participate in evaluation  Level of consciousness: awake and alert  Pain management: adequate  Airway patency: patent  Cardiovascular status: acceptable  Respiratory status: acceptable  Hydration status: acceptable  PONV: none     Anesthetic complications: None          Last vitals:  Vitals:    01/26/18 1153 01/26/18 1215 01/26/18 1315   BP: 127/80  131/83   Resp: 16 16 16   Temp: 36.9  C (98.4  F)     SpO2: 97% 96% 96%         Electronically Signed By: Juliet Covarrubias MD  January 26, 2018  1:57 PM

## 2018-01-26 NOTE — OR NURSING
Discharge instructions to pt and responsible adult.  All questions regarding discharge information answered.  Pt and responsible adult verbalized understanding of all discharge instruction information given. New prescriptions picked up prior to discharge.

## 2018-01-26 NOTE — IP AVS SNAPSHOT
MRN:[id number]                      After Visit Summary   1/26/2018    Bud Chiang    MRN: [id number]           Thank you!     Thank you for choosing [facility] for your care. Our goal is always to provide you with excellent care. Hearing back from our patients is one way we can continue to improve our services. Please take a few minutes to complete the written survey that you may receive in the mail after you visit with us. Thank you!        Patient Information     Date Of Birth          1960        About your hospital stay     You were admitted on:  January 26, 2018 You last received care in the:  Post Anesthesia Care Unit [facility]    You were discharged on:  January 26, 2018        Reason for your hospital stay       Open left lumbar 4-5 hemilaminectomy and microdiscectomy                  Who to Call     For medical emergencies, please call 251.  For non-urgent questions about your medical care, please call your primary care provider or clinic, [phone]  For questions related to your surgery, please call your surgery clinic        Attending Provider     Provider Specialty    Lilly Tijerina MD Neurosurgery       Primary Care Provider Office Phone # Fax #    Kenneth G Pallas, [phone]       When to contact your care team       Please call or go to the closest Emergency Room if you have increased pain, redness, drainage, or swelling at your incision, temperature > 101.5 degrees Farenheit, changes in neurologic status (such as headache, lightheadedness, dizziness, confusion, or numbness, tingling, or weakness in your face, arms, or legs) or if you have any other questions or concerns.    You may reach the Neurosurgery clinic at [phone] during regular business hours. You can call the hospital  at [phone] and ask for the on-call Neurosurgery Resident at all other times.                  After Care Instructions     Activity        Do not do any bending, twisting, strenuous exercise, or heavy lifting (greater than 10 pounds) for 4-6 weeks. Be careful and ask for assistance when walking or going up and down stairs. Avoid any activities that could result in trauma to the surgical wound. Do not drive while using narcotics or other sedating medications, such as sleep aids, muscle relaxants, etc.            Diet       Follow this diet upon discharge: Advance to a regular diet as tolerated            Wound care and dressings       You should remove your dressings and bandages on post-operative day #2 (1/28/18). You should then keep the wound undressed and open to air. You are allowed to take showers and get the wound wet starting on post-operative day #3 (1/29/18) but you may not scrub or soak the wound or keep it submerged under water. If you do happen to get the wound wet, be sure to pat dry it rather than scrubbing it with a towel.                  Follow-up Appointments     Adult [facility] Follow-up and recommended labs and tests       You should follow up with Regina Tolentino PA-C in Neurosurgery clinic in 2 weeks for suture removal, wound check, and general post-operative care. You should call [phone] or [phone] if you have not heard from the hospital within 2 days of discharge regarding your follow-up appointment.    You will follow up with Regina Tolentino PA-C again in 6 weeks. You will follow up with Dr. Tijerina in 3 months.                  Further instructions from your care team       REMEMBER: SAME DAY SURGERY IS NOT SAME DAY RECOVERY. TAKE IT EASY, GET PLENTY OF REST, AND HAVE SOMEONE WITH YOU FOR 24 HOURS. FOLLOW THESE INSTRUCTIONS AND PLEASE DO NOT HESITATE TO CALL WITH ANY QUESTIONS.   [facility], [place]  Same-Day Surgery   Adult Discharge Orders & Instructions     For 24 hours after surgery    1. Get plenty of rest.  A responsible adult must stay with you for at least 24 hours after you leave  the hospital.   2. Do not drive or use heavy equipment.  If you have weakness or tingling, don't drive or use heavy equipment until this feeling goes away.  3. Do not drink alcohol.  4. Avoid strenuous or risky activities.  Ask for help when climbing stairs.   5. You may feel lightheaded.  IF so, sit for a few minutes before standing.  Have someone help you get up.   6. If you have nausea (feel sick to your stomach): Drink only clear liquids such as apple juice, ginger ale, broth or 7-Up.  Rest may also help.  Be sure to drink enough fluids.  Move to a regular diet as you feel able.  7. You may have a slight fever. Call the doctor if your fever is over 100 F (37.7 C) (taken under the tongue) or lasts longer than 24 hours.  8. You may have a dry mouth, a sore throat, muscle aches or trouble sleeping.  These should go away after 24 hours.  9. Do not make important or legal decisions.   Call your doctor for any of the followin.  Signs of infection (fever, growing tenderness at the surgery site, a large amount of drainage or bleeding, severe pain, foul-smelling drainage, redness, swelling).    2. It has been over 8 to 10 hours since surgery and you are still not able to urinate (pass water).    3.  Headache for over 24 hours.      To contact a doctor, call Dr. Tijerina (Neurosurgery Clinic) [phone] or:      [phone] and ask for the resident on call for Neurosurgery (answered 24 hours a day)      Emergency Department:    [facility]: [phone]       (TTY for hearing impaired: [phone])                 Tips for taking pain medications  To get the best pain relief possible , remember these points:      Take pain medications as directed, before pain becomes severe      Pain medication can upset your stomach: taking it with food may help      Constipation is a common side effect of pain medication. Drink plenty of  Fluids      Eat foods high in fiber. Take a stool softener  if recommended by your  "doctor or  Pharmacist.        Do not drink alcohol, drive or operate machinery while taking pain medications.      Ask about other ways to control pain, such as with heat, ice or relaxation.                Pending Results     No orders found from 2018 to 2018.            Admission Information     Date & Time Provider Department Dept. Phone    2018 Lilly Tijerina MD Post Anesthesia Care Unit [facility] [phone]      Your Vitals Were     Blood Pressure Temperature Respirations Height Weight Pulse Oximetry    124/86 97.3  F (36.3  C) (Oral) 20 1.83 m (6' 0.05\") 106.4 kg (234 lb 9.1 oz) 96%    BMI (Body Mass Index)                   31.77 kg/m2           [employer] Information     [employer] lets you send messages to your doctor, view your test results, renew your prescriptions, schedule appointments and more. To sign up, go to [email or web address] . Click on \"Log in\" on the left side of the screen, which will take you to the Welcome page. Then click on \"Sign up Now\" on the right side of the page.     You will be asked to enter the access code listed below, as well as some personal information. Please follow the directions to create your username and password.     Your access code is: [email or web address]  Expires: 2018  6:30 AM     Your access code will  in 90 days. If you need help or a new code, please call your [place] clinic or [phone].        Care EveryWhere ID     This is your Care EveryWhere ID. This could be used by other organizations to access your [place] medical records  [id number]        Equal Access to Services     MILAN OLSEN : Hadjazmine Hardin, alma rodriguez, iva ahuja. So [facility] [phone].    ATENCIÓN: Si habla español, tiene a sherman disposición servicios gratuitos de asistencia lingüística. Llame al [phone].    We comply with applicable federal civil rights laws and [place] laws. " We do not discriminate on the basis of race, color, national origin, age, disability, sex, sexual orientation, or gender identity.               Review of your medicines      START taking        Dose / Directions    polyethylene glycol powder   Commonly known as:  MIRALAX   Used for:  Lumbar stenosis with neurogenic claudication        Dose:  1 capful   Take 17 g (1 capful) by mouth 2 times daily   Quantity:  500 g   Refills:  3       senna-docusate 8.6-50 MG per tablet   Commonly known as:  SENOKOT-S;PERICOLACE   Used for:  Lumbar stenosis with neurogenic claudication        Dose:  1 tablet   Take 1 tablet by mouth 2 times daily   Quantity:  60 tablet   Refills:  1         CONTINUE these medicines which may have CHANGED, or have new prescriptions. If we are uncertain of the size of tablets/capsules you have at home, strength may be listed as something that might have changed.        Dose / Directions    oxyCODONE-acetaminophen 5-325 MG per tablet   Commonly known as:  PERCOCET   This may have changed:    - medication strength  - how much to take   Used for:  Lumbar stenosis with neurogenic claudication        Dose:  1-2 tablet   Take 1-2 tablets by mouth 4 times daily as needed   Quantity:  50 tablet   Refills:  0         CONTINUE these medicines which have NOT CHANGED        Dose / Directions    AIRBORNE PO        Take by mouth as needed   Refills:  0       SIMVASTATIN PO        Dose:  20 mg   Take 20 mg by mouth At Bedtime   Refills:  0       WELLBUTRIN PO        Dose:  150 mg   Take 150 mg by mouth 2 times daily   Refills:  0            Where to get your medicines      Some of these will need a paper prescription and others can be bought over the counter. Ask your nurse if you have questions.     Bring a paper prescription for each of these medications     oxyCODONE-acetaminophen 5-325 MG per tablet    polyethylene glycol powder    senna-docusate 8.6-50 MG per tablet                Protect others around you:  Learn how to safely use, store and throw away your medicines at www.disposemymeds.org.        Information about OPIOIDS     PRESCRIPTION OPIOIDS: WHAT YOU NEED TO KNOW    Prescription opioids can be used to help relieve moderate to severe pain and are often prescribed following a surgery or injury, or for certain health conditions. These medications can be an important part of treatment but also come with serious risks. It is important to work with your health care provider to make sure you are getting the safest, most effective care.    WHAT ARE THE RISKS AND SIDE EFFECTS OF OPIOID USE?  Prescription opioids carry serious risks of addiction and overdose, especially with prolonged use. An opioid overdose, often marked by slowed breathing can cause sudden death. The use of prescription opioids can have a number of side effects as well, even when taken as directed:      Tolerance - meaning you might need to take more of a medication for the same pain relief    Physical dependence - meaning you have symptoms of withdrawal when a medication is stopped    Increased sensitivity to pain    Constipation    Nausea, vomiting, and dry mouth    Sleepiness and dizziness    Confusion    Depression    Low levels of testosterone that can result in lower sex drive, energy, and strength    Itching and sweating    RISKS ARE GREATER WITH:    History of drug misuse, substance use disorder, or overdose    Mental health conditions (such as depression or anxiety)    Sleep apnea    Older age (65 years or older)    Pregnancy    Avoid alcohol while taking prescription opioids.   Also, unless specifically advised by your health care provider, medications to avoid include:    Benzodiazepines (such as Xanax or Valium)    Muscle relaxants (such as Soma or Flexeril)    Hypnotics (such as Ambien or Lunesta)    Other prescription opioids    KNOW YOUR OPTIONS:  Talk to your health care provider about ways to manage your pain that do not involve  prescription opioids. Some of these options may actually work better and have fewer risks and side effects:    Pain relievers such as acetaminophen, ibuprofen, and naproxen    Some medications that are also used for depression or seizures    Physical therapy and exercise    Cognitive behavioral therapy, a psychological, goal-directed approach, in which patients learn how to modify physical, behavioral, and emotional triggers of pain and stress    IF YOU ARE PRESCRIBED OPIOIDS FOR PAIN:    Never take opioids in greater amounts or more often than prescribed    Follow up with your primary health care provider and work together to create a plan on how to manage your pain.    Talk about ways to help manage your pain that do not involve prescription opioids    Talk about all concerns and side effects    Help prevent misuse and abuse    Never sell or share prescription opioids    Never use another person's prescription opioids    Store prescription opioids in a secure place and out of reach of others (this may include visitors, children, friends, and family)    Visit www.cdc.gov/drugoverdose to learn about risks of opioid abuse and overdose    If you believe you may be struggling with addiction, tell your health care provider and ask for guidance or call [facility]'s National Helpline at 8-578-371-HELP    LEARN MORE / www.cdc.gov/drugoverdose/prescribing/guideline.html    Safely dispose of unused prescription opioids: Find your local drug take-back programs and more information about the importance of safe disposal at www.doseofreality.[place].gov             Medication List: This is a list of all your medications and when to take them. Check marks below indicate your daily home schedule. Keep this list as a reference.      Medications           Morning Afternoon Evening Bedtime As Needed    AIRBORNE PO   Take by mouth as needed                                oxyCODONE-acetaminophen 5-325 MG per tablet   Commonly known as:   PERCOCET   Take 1-2 tablets by mouth 4 times daily as needed                                polyethylene glycol powder   Commonly known as:  MIRALAX   Take 17 g (1 capful) by mouth 2 times daily                                senna-docusate 8.6-50 MG per tablet   Commonly known as:  SENOKOT-S;PERICOLACE   Take 1 tablet by mouth 2 times daily                                SIMVASTATIN PO   Take 20 mg by mouth At Bedtime                                WELLBUTRIN PO   Take 150 mg by mouth 2 times daily

## 2018-01-26 NOTE — BRIEF OP NOTE
[facility], [place]    Brief Operative Note    Pre-operative diagnosis: Lumbar Radiculopathy  Post-operative diagnosis same     Procedure(s):  Open BILATERAL Lumbar 4-5 Hemilaminectomy And Microdiscectomy,  Bilateral Approach - Wound Class: I-Clean    Surgeon: Surgeon(s) and Role:     * Lilly Tijerina MD - Primary     * Royer Pollard MD - Resident - Assisting  Anesthesia: General   Estimated blood loss: Less than 10 ml  Drains: None  Specimens: * No specimens in log *  Findings:   Large L4-5 disc herniation with severe thecal sac compression prior to removal.  Complications: None.  Implants: None.
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
## 2018-02-09 ENCOUNTER — OFFICE VISIT (OUTPATIENT)
Dept: NEUROSURGERY | Facility: CLINIC | Age: 58
End: 2018-02-09
Payer: COMMERCIAL

## 2018-02-09 VITALS
BODY MASS INDEX: 31.69 KG/M2 | HEART RATE: 69 BPM | SYSTOLIC BLOOD PRESSURE: 146 MMHG | WEIGHT: 234 LBS | OXYGEN SATURATION: 99 % | HEIGHT: 72 IN | TEMPERATURE: 97.7 F | RESPIRATION RATE: 24 BRPM | DIASTOLIC BLOOD PRESSURE: 82 MMHG

## 2018-02-09 DIAGNOSIS — M51.16 LUMBAR DISC HERNIATION WITH RADICULOPATHY: Primary | ICD-10-CM

## 2018-02-09 DIAGNOSIS — Z98.890 POST-OPERATIVE STATE: ICD-10-CM

## 2018-02-09 RX ORDER — IBUPROFEN 200 MG
600 TABLET ORAL EVERY 4 HOURS PRN
COMMUNITY

## 2018-02-09 ASSESSMENT — PAIN SCALES - GENERAL: PAINLEVEL: NO PAIN (0)

## 2018-02-09 NOTE — LETTER
2/9/2018       RE: Bud Chiang  [address]  [facility] [address]     Dear Colleague,    Thank you for referring your patient, Bud Chiang, to the [facility] NEUROSURGERY at [facility]. Please see a copy of my visit note below.      Neurosurgery clinic post op wound check  Date of visit: 2/9/2018      Procedure:  1/26/18 Breezy Pollard  1. Open BILATERAL Lumbar 4-5 Hemilaminectomy And Microdiscectomy,  Bilateral Approach     INDICATIONS: Bud Chiang is a 57 year old male with severe stenosis at L4-5 due to very large disc herniation with cephalad migration. Disc is occupying over 80% of canal volume and complete effacement of CSF exists with severe cauda equina stenosis. Patient has severe low back pain and severe bilateral radicular leg pain, as well as difficulty initiating urinary stream. Therefore he was taken to surgery urgently within 24 hours of my initial consultation with him in my clinic.  Because the disc herniation had a large central component, I discussed a midline open approach to enable bilateral microdiskectomy to ensure complete removal and decompression.   HPI:  Inpatient:     Outpatient:   He is now 2 weeks post op.   Since discharge he has done very well his leg pain is gone.   Bowel and bladder a functioning well.   He presents for routine wound check and suture/staple removal.    STATUS REPORT  WORK:         Is not back at work.  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   ACTIVITY:     Has been following activity restrictions.  Now wants to start: light exercise    MEDS:                  Pain         Ibuprofen and Tylenol PM      NICOTINE:   no    Patient Supplied Answers To the UC Pain Questionnaire  UC Pain -  Patient Entered Questionnaire/Answers 2/9/2018   What number best describes your pain right now:  0 = No pain  to  10 = Worst pain imaginable 3   How would you describe the pain? numbness  "  Which of the following worsen your pain? walking, exercise   Which of the following improve or reduce your pain?  lying down, sitting, medication, relaxation   What number best describes your average pain for the past week:  0 = No pain  to  10 = Worst pain imaginable 3   What number best describes your LOWEST pain in past 24 hours:  0 = No pain  to  10 = Worst pain imaginable 2   What number best describes your WORST pain in past 24 hours:  0 = No pain  to  10 = Worst pain imaginable 3   When is your pain worst? Constant   What non-medicine treatments have you already had for your pain? exercise   Have you tried treating your pain with medication?  Yes   Are you currently taking medications for your pain? No            Current Outpatient Prescriptions:      ibuprofen (ADVIL/MOTRIN) 200 MG tablet, Take 600 mg by mouth every 4 hours as needed for mild pain or moderate pain, Disp: , Rfl:      Acetaminophen (TYLENOL PO), Take 1,000 mg by mouth every 8 hours as needed for mild pain or fever, Disp: , Rfl:      Multiple Vitamins-Minerals (AIRBORNE PO), Take 1 tablet by mouth as needed , Disp: , Rfl:      SIMVASTATIN PO, Take 20 mg by mouth At Bedtime, Disp: , Rfl:      BuPROPion HCl (WELLBUTRIN PO), Take 150 mg by mouth 2 times daily , Disp: , Rfl:   Allergies   Allergen Reactions     Eggs [Chicken-Derived Products (Egg)] GI Disturbance     PMH, SOC HIST, FAM HIST, PROBLEM LIST:  All reviewed in EPIC.    OBJECTIVE:  /82  Pulse 69  Temp 97.7  F (36.5  C) (Oral)  Resp 24  Ht 1.816 m (5' 11.5\")  Wt 106.1 kg (234 lb)  SpO2 99%  BMI 32.18 kg/m2    Imaging:  None new.    EXAM:  Well developed well nourished male found seated comfortably in exam chair.  No apparent distress. He is accompanied.  A&O X3.  Mood and affect WNL. Language and fund of knowledge intact.  Is able to sit and rise independently.   He has a nicely healing incision.  I prepped the wound with chloroprep and cleanly removed nylon sutures/staples " without difficulty.    Exam PREOP:   Bilateral EHL 4/5 otherwise 5/5  Exam today:  5/5 bilaterally    Assessment/Plan:  1. Lumbar disc herniation with radiculopathy    2. Post-operative state      Bud Chiang is doing well 2 weeks post op after his bilateral L4 5 discectomy for a very large disc herniation. He has very good early neurologic recovery.  His feet are still quite densely numb but he has good return of function motor function. I reassured him and I am hopeful he will get sensation back.    The wound is healthy without evidence infection. The wound looks great. She can start getting into a hot tub bath tub or swimming pool in the next week or so.  We discussed wound care.  *  May swim or bathe when all scabbing is gone from the incision.  We discussed medication.    *  FYI: In general we prescribe narcotics for pain management in the post operative recovery phase generally 2-6 weeks post op, depending on the surgery performed.     *  Medications prescribed today:  no medications prescribed today. .  We discussed activities and return to work.  *  We recommend he continue the current activity restrictions until he returns for the next visit.  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 I think that's a little too vigorous at this time.  I'll see him again before his short-term disability runs out and we can reassess that.   *    I filled out a do not RTW form today.    *   He can return to driving if: he is off narcotic   We discussed follow up   *  Return to clinic in 4 weeks.  Imaging for that visit: none  *  All the patient's questions have been answered and they demonstrate good understanding of the above.    *   The patient has our contact information and is aware that he should call if he has questions comments or concerns.     We appreciate the opportunity to be of service in the care of this pleasant patient.  Please do call if there is anything more we can  do Regina Tolentino PA-C  [facility]  Department of Neurosurgery  Phone: [phone]  Fax: [phone]

## 2018-02-09 NOTE — NURSING NOTE
Chief Complaint   Patient presents with     RECHECK     UMP- 2 WEEKS POST-OP F/U     Vincent Mora, CMA

## 2018-02-09 NOTE — PROGRESS NOTES
Neurosurgery clinic post op wound check  Date of visit: 2/9/2018      Procedure:  1/26/18 Tijerina  Atul  1. Open BILATERAL Lumbar 4-5 Hemilaminectomy And Microdiscectomy,  Bilateral Approach     INDICATIONS: Bud Chiang is a 57 year old male with severe stenosis at L4-5 due to very large disc herniation with cephalad migration. Disc is occupying over 80% of canal volume and complete effacement of CSF exists with severe cauda equina stenosis. Patient has severe low back pain and severe bilateral radicular leg pain, as well as difficulty initiating urinary stream. Therefore he was taken to surgery urgently within 24 hours of my initial consultation with him in my clinic.  Because the disc herniation had a large central component, I discussed a midline open approach to enable bilateral microdiskectomy to ensure complete removal and decompression.   HPI:  Inpatient:     Outpatient:   He is now 2 weeks post op.   Since discharge he has done very well his leg pain is gone.   Bowel and bladder a functioning well.   He presents for routine wound check and suture/staple removal.    STATUS REPORT  WORK:         Is not back at work.  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   ACTIVITY:     Has been following activity restrictions.  Now wants to start: light exercise    MEDS:                  Pain         Ibuprofen and Tylenol PM      NICOTINE:   no    Patient Supplied Answers To the UC Pain Questionnaire  UC Pain -  Patient Entered Questionnaire/Answers 2/9/2018   What number best describes your pain right now:  0 = No pain  to  10 = Worst pain imaginable 3   How would you describe the pain? numbness   Which of the following worsen your pain? walking, exercise   Which of the following improve or reduce your pain?  lying down, sitting, medication, relaxation   What number best describes your average pain for the past week:  0 = No pain  to  10 = Worst pain imaginable 3   What  "number best describes your LOWEST pain in past 24 hours:  0 = No pain  to  10 = Worst pain imaginable 2   What number best describes your WORST pain in past 24 hours:  0 = No pain  to  10 = Worst pain imaginable 3   When is your pain worst? Constant   What non-medicine treatments have you already had for your pain? exercise   Have you tried treating your pain with medication?  Yes   Are you currently taking medications for your pain? No            Current Outpatient Prescriptions:      ibuprofen (ADVIL/MOTRIN) 200 MG tablet, Take 600 mg by mouth every 4 hours as needed for mild pain or moderate pain, Disp: , Rfl:      Acetaminophen (TYLENOL PO), Take 1,000 mg by mouth every 8 hours as needed for mild pain or fever, Disp: , Rfl:      Multiple Vitamins-Minerals (AIRBORNE PO), Take 1 tablet by mouth as needed , Disp: , Rfl:      SIMVASTATIN PO, Take 20 mg by mouth At Bedtime, Disp: , Rfl:      BuPROPion HCl (WELLBUTRIN PO), Take 150 mg by mouth 2 times daily , Disp: , Rfl:   Allergies   Allergen Reactions     Eggs [Chicken-Derived Products (Egg)] GI Disturbance     PMH, SOC HIST, FAM HIST, PROBLEM LIST:  All reviewed in EPIC.    OBJECTIVE:  /82  Pulse 69  Temp 97.7  F (36.5  C) (Oral)  Resp 24  Ht 1.816 m (5' 11.5\")  Wt 106.1 kg (234 lb)  SpO2 99%  BMI 32.18 kg/m2    Imaging:  None new.    EXAM:  Well developed well nourished male found seated comfortably in exam chair.  No apparent distress. He is accompanied.  A&O X3.  Mood and affect WNL. Language and fund of knowledge intact.  Is able to sit and rise independently.   He has a nicely healing incision.  I prepped the wound with chloroprep and cleanly removed nylon sutures/staples without difficulty.    Exam PREOP:   Bilateral EHL 4/5 otherwise 5/5  Exam today:  5/5 bilaterally    Assessment/Plan:  1. Lumbar disc herniation with radiculopathy    2. Post-operative state      Bud Chiang is doing well 2 weeks post op after his bilateral L4 5 discectomy " for a very large disc herniation. He has very good early neurologic recovery.  His feet are still quite densely numb but he has good return of function motor function. I reassured him and I am hopeful he will get sensation back.    The wound is healthy without evidence infection. The wound looks great. She can start getting into a hot tub bath tub or swimming pool in the next week or so.  We discussed wound care.  *  May swim or bathe when all scabbing is gone from the incision.  We discussed medication.    *  FYI: In general we prescribe narcotics for pain management in the post operative recovery phase generally 2-6 weeks post op, depending on the surgery performed.     *  Medications prescribed today:  no medications prescribed today. .  We discussed activities and return to work.  *  We recommend he continue the current activity restrictions until he returns for the next visit.  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 I think that's a little too vigorous at this time.  I'll see him again before his short-term disability runs out and we can reassess that.   *    I filled out a do not RTW form today.    *   He can return to driving if: he is off narcotic   We discussed follow up   *  Return to clinic in 4 weeks.  Imaging for that visit: none  *  All the patient's questions have been answered and they demonstrate good understanding of the above.    *   The patient has our contact information and is aware that he should call if he has questions comments or concerns.     We appreciate the opportunity to be of service in the care of this pleasant patient.  Please do call if there is anything more we can do    Regina Tolentino PA-C  [facility]  Department of Neurosurgery  Phone: [phone]  Fax: [phone]    This note was generated using voice recognition software. While edited for content some inaccurate phrasing may be found.

## 2018-02-09 NOTE — MR AVS SNAPSHOT
After Visit Summary   2/9/2018    Bud Chiang    MRN: [id number]           Patient Information     Date Of Birth          1960        Visit Information        Provider Department      2/9/2018 1:30 PM Regina Tolentino PA-C [facility] Neurosurgery        Today's Diagnoses     Lumbar disc herniation with radiculopathy    -  1    Post-operative state           Follow-ups after your visit        Your next 10 appointments already scheduled     Mar 09, 2018 12:00 PM CST   (Arrive by 11:45 AM)   Return Visit with DAWN Hay [facility] Neurosurgery ([facility])    [address] [address] [phone]              Who to contact     Please call your clinic at [phone] to:    Ask questions about your health    Make or cancel appointments    Discuss your medicines    Learn about your test results    Speak to your doctor            Additional Information About Your Visit        [employer] Information     [employer] gives you secure access to your electronic health record. If you see a primary care provider, you can also send messages to your care team and make appointments. If you have questions, please call your primary care clinic.  If you do not have a primary care provider, please call [phone] and they will assist you.      [employer] is an electronic gateway that provides easy, online access to your medical records. With [employer], you can request a clinic appointment, read your test results, renew a prescription or communicate with your care team.     To access your existing account, please contact your [facility] or call [phone] for assistance.        Care EveryWhere ID     This is your Care EveryWhere ID. This could be used by other organizations to access your [place] medical records  [id number]        Your Vitals Were     Pulse Temperature Respirations Height Pulse Oximetry BMI (Body  "Mass Index)    69 97.7  F (36.5  C) (Oral) 24 1.816 m (5' 11.5\") 99% 32.18 kg/m2       Blood Pressure from Last 3 Encounters:   02/09/18 146/82   01/26/18 133/84   01/25/18 133/86    Weight from Last 3 Encounters:   02/09/18 106.1 kg (234 lb)   01/26/18 106.4 kg (234 lb 9.1 oz)   01/25/18 107.5 kg (236 lb 14.4 oz)              Today, you had the following     No orders found for display       Primary Care Provider Office Phone # Fax #    Kenneth G Pallas, [phone]       [facility] [id number] [facility] [address] [address]        Equal Access to Services     [facility]: Hadii kaden mcmahono Sofabio, waaxduc luqadaha, qaybta kaalmada nery, iva walker . So [facility] [phone].    ATENCIÓN: Si habla español, tiene a sherman disposición servicios gratuitos de asistencia lingüística. Rex al [phone].    We comply with applicable federal civil rights laws and [place] laws. We do not discriminate on the basis of race, color, national origin, age, disability, sex, sexual orientation, or gender identity.            Thank you!     Thank you for choosing [facility]  for your care. Our goal is always to provide you with excellent care. Hearing back from our patients is one way we can continue to improve our services. Please take a few minutes to complete the written survey that you may receive in the mail after your visit with us. Thank you!             Your Updated Medication List - Protect others around you: Learn how to safely use, store and throw away your medicines at www.disposemymeds.org.          This list is accurate as of 2/9/18  2:25 PM.  Always use your most recent med list.                   Brand Name Dispense Instructions for use Diagnosis    AIRBORNE PO      Take 1 tablet by mouth as needed        ibuprofen 200 MG tablet    ADVIL/MOTRIN     Take 600 mg by mouth every 4 hours as needed for mild pain or moderate pain        " SIMVASTATIN PO      Take 20 mg by mouth At Bedtime        TYLENOL PO      Take 1,000 mg by mouth every 8 hours as needed for mild pain or fever        WELLBUTRIN PO      Take 150 mg by mouth 2 times daily

## 2018-03-09 ENCOUNTER — OFFICE VISIT (OUTPATIENT)
Dept: NEUROSURGERY | Facility: CLINIC | Age: 58
End: 2018-03-09
Payer: COMMERCIAL

## 2018-03-09 VITALS
BODY MASS INDEX: 32 KG/M2 | TEMPERATURE: 98 F | HEIGHT: 72 IN | OXYGEN SATURATION: 98 % | WEIGHT: 236.3 LBS | SYSTOLIC BLOOD PRESSURE: 137 MMHG | DIASTOLIC BLOOD PRESSURE: 91 MMHG | RESPIRATION RATE: 24 BRPM | HEART RATE: 70 BPM

## 2018-03-09 DIAGNOSIS — M51.16 LUMBAR DISC HERNIATION WITH RADICULOPATHY: Primary | ICD-10-CM

## 2018-03-09 DIAGNOSIS — Z98.890 POST-OPERATIVE STATE: ICD-10-CM

## 2018-03-09 ASSESSMENT — PAIN SCALES - GENERAL: PAINLEVEL: MILD PAIN (2)

## 2018-03-09 NOTE — LETTER
3/9/2018       RE: Bud Chiang  [address]  [facility] [address]     Dear Colleague,    Thank you for referring your patient, Bud Chiang, to the [facility] NEUROSURGERY at [facility]. Please see a copy of my visit note below.      Neurosurgery clinic post op  Date of visit: 3/9/2018      Procedure:  1/26/18 Breezy Pollard  1. Open BILATERAL Lumbar 4-5 Hemilaminectomy And Microdiscectomy,  Bilateral Approach     INDICATIONS: Bud Chiang is a 57 year old male with severe stenosis at L4-5 due to very large disc herniation with cephalad migration. Disc is occupying over 80% of canal volume and complete effacement of CSF exists with severe cauda equina stenosis. Patient has severe low back pain and severe bilateral radicular leg pain, as well as difficulty initiating urinary stream. Therefore he was taken to surgery urgently within 24 hours of my initial consultation with him in my clinic.  Because the disc herniation had a large central component, I discussed a midline open approach to enable bilateral microdiskectomy to ensure complete removal and decompression.   HPI:  He is now 6 weeks post op.   Since discharge he has done very well his leg pain is gone.   Bowel and bladder are functioning well. His numbness is gone. He has only a little cramping in the posterior right calf from time to time. He thinks he is ready to return to work.  He presents for routine wound 6 week checkup.    Patient Supplied Answers To the UC Pain Questionnaire  UC Pain -  Patient Entered Questionnaire/Answers 3/9/2018   What number best describes your pain right now:  0 = No pain  to  10 = Worst pain imaginable 2   How would you describe the pain? cramping, numbness   Which of the following worsen your pain? standing, walking   Which of the following improve or reduce your pain?  lying down, sitting, relaxation   What number best describes your average pain for the past week:  0 = No pain   "to  10 = Worst pain imaginable 2   What number best describes your LOWEST pain in past 24 hours:  0 = No pain  to  10 = Worst pain imaginable 1   What number best describes your WORST pain in past 24 hours:  0 = No pain  to  10 = Worst pain imaginable 4   When is your pain worst? AM, PM   What non-medicine treatments have you already had for your pain? surgery   Have you tried treating your pain with medication?  Yes   Are you currently taking medications for your pain? No            Current Outpatient Prescriptions:      ibuprofen (ADVIL/MOTRIN) 200 MG tablet, Take 600 mg by mouth every 4 hours as needed for mild pain or moderate pain, Disp: , Rfl:      Acetaminophen (TYLENOL PO), Take 1,000 mg by mouth every 8 hours as needed for mild pain or fever, Disp: , Rfl:      SIMVASTATIN PO, Take 20 mg by mouth At Bedtime, Disp: , Rfl:      BuPROPion HCl (WELLBUTRIN PO), Take 150 mg by mouth 2 times daily , Disp: , Rfl:   Allergies   Allergen Reactions     Eggs [Chicken-Derived Products (Egg)] GI Disturbance     PMH, SOC HIST, FAM HIST, PROBLEM LIST:  All reviewed in EPIC.    OBJECTIVE:  BP (!) 137/91  Pulse 70  Temp 98  F (36.7  C) (Oral)  Resp 24  Ht 1.816 m (5' 11.5\")  Wt 107.2 kg (236 lb 4.8 oz)  SpO2 98%  BMI 32.5 kg/m2    Imaging:  None new.    EXAM:  Well developed well nourished male found seated comfortably in exam chair.  No apparent distress. He is accompanied.  A&O X3.  Mood and affect WNL. Language and fund of knowledge intact.  Is able to sit and rise independently.   He has a nicely healed incision.    Exam PREOP:   Bilateral EHL 4/5 otherwise 5/5  Exam today:  5/5 bilaterally    Assessment/Plan:  1. Lumbar disc herniation with radiculopathy    2. Post-operative state      Bud Chiang is doing well 6 weeks post op after his bilateral L4-5 discectomy for a very large disc herniation. He has very good neurologic recovery thus far.  His feet are much improved his numbness is practically gone.  We " discussed activities and return to work.  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 No restrictions but realize he might have numbness or discomfort when his nerves get tired.I wrote him printed a return to work form today  *  We recommend he limit heavy lifting.  We recommend walking for aerobic exercise, and yoga or Pilate's for core health.  We recommend he limit or avoid running for exercise and contact sports.    We discussed follow up  *  Return to clinic in 6 weeks with Dr Tijerina.  Imaging for that visit: none  *  All the patient's questions have been answered and they demonstrate good understanding of the above.    *   The patient has our contact information and is aware that he should call if he has questions comments or concerns.     We appreciate the opportunity to be of service in the care of this pleasant patient.  Please do call if there is anything more we can do    Regina Tolentino PA-C  [facility]  Department of Neurosurgery  Phone: [phone]  Fax: [phone]    This note was generated using voice recognition software. While edited for content some inaccurate phrasing may be found.

## 2018-03-09 NOTE — MR AVS SNAPSHOT
After Visit Summary   3/9/2018    Bud Chiang    MRN: [id number]           Patient Information     Date Of Birth          1960        Visit Information        Provider Department      3/9/2018 12:00 PM Regina Tolentino PA-C [facility]        Today's Diagnoses     Lumbar disc herniation with radiculopathy    -  1    Post-operative state           Follow-ups after your visit        Your next 10 appointments already scheduled     Mar 20, 2018 10:00 AM CDT   New Sleep Patient with Vishnu Tovar MD   [facility] ([facility])    [address]  [facility] [address]   [phone]            Apr 19, 2018  2:45 PM CDT   (Arrive by 2:30 PM)   Return Visit with Lilly Tijerina MD   [facility] ([facility])    [address] [address] [phone]              Who to contact     Please call your clinic at [phone] to:    Ask questions about your health    Make or cancel appointments    Discuss your medicines    Learn about your test results    Speak to your doctor            Additional Information About Your Visit        [employer] Information     [employer] gives you secure access to your electronic health record. If you see a primary care provider, you can also send messages to your care team and make appointments. If you have questions, please call your primary care clinic.  If you do not have a primary care provider, please call [phone] and they will assist you.      [employer] is an electronic gateway that provides easy, online access to your medical records. With [employer], you can request a clinic appointment, read your test results, renew a prescription or communicate with your care team.     To access your existing account, please contact your [facility] or call [phone] for assistance.        Care  "EveryWhere ID     This is your Care EveryWhere ID. This could be used by other organizations to access your [place] medical records  [id number]        Your Vitals Were     Pulse Temperature Respirations Height Pulse Oximetry BMI (Body Mass Index)    70 98  F (36.7  C) (Oral) 24 1.816 m (5' 11.5\") 98% 32.5 kg/m2       Blood Pressure from Last 3 Encounters:   03/09/18 (!) 137/91   02/09/18 146/82   01/26/18 133/84    Weight from Last 3 Encounters:   03/09/18 107.2 kg (236 lb 4.8 oz)   02/09/18 106.1 kg (234 lb)   01/26/18 106.4 kg (234 lb 9.1 oz)              Today, you had the following     No orders found for display       Primary Care Provider Office Phone # Fax #    Kenneth G Pallas, [phone]       [facility] [address] [address]        Equal Access to Services     [facility]: Hadii kaden ku hadasho Soomaali, waaxda luqadaha, qaybta kaalmada adeegyada, iva walker . So [facility] [phone].    ATENCIÓN: Si habla español, tiene a sherman disposición servicios gratuitos de asistencia lingüística. Llame al [phone].    We comply with applicable federal civil rights laws and [place] laws. We do not discriminate on the basis of race, color, national origin, age, disability, sex, sexual orientation, or gender identity.            Thank you!     Thank you for choosing [facility]  for your care. Our goal is always to provide you with excellent care. Hearing back from our patients is one way we can continue to improve our services. Please take a few minutes to complete the written survey that you may receive in the mail after your visit with us. Thank you!             Your Updated Medication List - Protect others around you: Learn how to safely use, store and throw away your medicines at www.disposemymeds.org.          This list is accurate as of 3/9/18  1:13 PM.  Always use your most recent med list.                   " Brand Name Dispense Instructions for use Diagnosis    ibuprofen 200 MG tablet    ADVIL/MOTRIN     Take 600 mg by mouth every 4 hours as needed for mild pain or moderate pain        SIMVASTATIN PO      Take 20 mg by mouth At Bedtime        TYLENOL PO      Take 1,000 mg by mouth every 8 hours as needed for mild pain or fever        WELLBUTRIN PO      Take 150 mg by mouth 2 times daily

## 2018-03-09 NOTE — NURSING NOTE
Chief Complaint   Patient presents with     RECHECK     [facility]- 6 WEEKS POST-OP F/U     Vincent Mora, CMA

## 2018-03-09 NOTE — PROGRESS NOTES
Neurosurgery clinic post op  Date of visit: 3/9/2018      Procedure:  1/26/18 Breezy Vazquezd  1. Open BILATERAL Lumbar 4-5 Hemilaminectomy And Microdiscectomy,  Bilateral Approach     INDICATIONS: Bud Chiang is a 57 year old male with severe stenosis at L4-5 due to very large disc herniation with cephalad migration. Disc is occupying over 80% of canal volume and complete effacement of CSF exists with severe cauda equina stenosis. Patient has severe low back pain and severe bilateral radicular leg pain, as well as difficulty initiating urinary stream. Therefore he was taken to surgery urgently within 24 hours of my initial consultation with him in my clinic.  Because the disc herniation had a large central component, I discussed a midline open approach to enable bilateral microdiskectomy to ensure complete removal and decompression.   HPI:  He is now 6 weeks post op.   Since discharge he has done very well his leg pain is gone.   Bowel and bladder are functioning well. His numbness is gone. He has only a little cramping in the posterior right calf from time to time. He thinks he is ready to return to work.  He presents for routine wound 6 week checkup.    Patient Supplied Answers To the [facility] Pain Questionnaire  [facility] Pain -  Patient Entered Questionnaire/Answers 3/9/2018   What number best describes your pain right now:  0 = No pain  to  10 = Worst pain imaginable 2   How would you describe the pain? cramping, numbness   Which of the following worsen your pain? standing, walking   Which of the following improve or reduce your pain?  lying down, sitting, relaxation   What number best describes your average pain for the past week:  0 = No pain  to  10 = Worst pain imaginable 2   What number best describes your LOWEST pain in past 24 hours:  0 = No pain  to  10 = Worst pain imaginable 1   What number best describes your WORST pain in past 24 hours:  0 = No pain  to  10 = Worst pain imaginable 4   When is your pain  "worst? AM, PM   What non-medicine treatments have you already had for your pain? surgery   Have you tried treating your pain with medication?  Yes   Are you currently taking medications for your pain? No            Current Outpatient Prescriptions:      ibuprofen (ADVIL/MOTRIN) 200 MG tablet, Take 600 mg by mouth every 4 hours as needed for mild pain or moderate pain, Disp: , Rfl:      Acetaminophen (TYLENOL PO), Take 1,000 mg by mouth every 8 hours as needed for mild pain or fever, Disp: , Rfl:      SIMVASTATIN PO, Take 20 mg by mouth At Bedtime, Disp: , Rfl:      BuPROPion HCl (WELLBUTRIN PO), Take 150 mg by mouth 2 times daily , Disp: , Rfl:   Allergies   Allergen Reactions     Eggs [Chicken-Derived Products (Egg)] GI Disturbance     PMH, SOC HIST, FAM HIST, PROBLEM LIST:  All reviewed in EPIC.    OBJECTIVE:  BP (!) 137/91  Pulse 70  Temp 98  F (36.7  C) (Oral)  Resp 24  Ht 1.816 m (5' 11.5\")  Wt 107.2 kg (236 lb 4.8 oz)  SpO2 98%  BMI 32.5 kg/m2    Imaging:  None new.    EXAM:  Well developed well nourished male found seated comfortably in exam chair.  No apparent distress. He is accompanied.  A&O X3.  Mood and affect WNL. Language and fund of knowledge intact.  Is able to sit and rise independently.   He has a nicely healed incision.    Exam PREOP:   Bilateral EHL 4/5 otherwise 5/5  Exam today:  5/5 bilaterally    Assessment/Plan:  1. Lumbar disc herniation with radiculopathy    2. Post-operative state      Bud Chiang is doing well 6 weeks post op after his bilateral L4-5 discectomy for a very large disc herniation. He has very good neurologic recovery thus far.  His feet are much improved his numbness is practically gone.  We discussed activities and return to work.  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 No restrictions but realize he might have numbness or discomfort when his nerves get tired.I wrote him printed a return to work form today  *  We recommend he " limit heavy lifting.  We recommend walking for aerobic exercise, and yoga or Pilate's for core health.  We recommend he limit or avoid running for exercise and contact sports.    We discussed follow up  *  Return to clinic in 6 weeks with Dr Tijerina.  Imaging for that visit: none  *  All the patient's questions have been answered and they demonstrate good understanding of the above.    *   The patient has our contact information and is aware that he should call if he has questions comments or concerns.     We appreciate the opportunity to be of service in the care of this pleasant patient.  Please do call if there is anything more we can do    Regina Tolentino PA-C  [facility]  Department of Neurosurgery  Phone: [phone]  Fax: [phone]    This note was generated using voice recognition software. While edited for content some inaccurate phrasing may be found.

## 2018-03-09 NOTE — LETTER
Return to Work Release    Date: 3/9/2018      Name: Bud Chiang                       YOB: 1960    Medical Record Number: [id number]    The patient was seen at:  NEUROSURGERY CLINIC    Restrictions: Nothing formal but recommend he be able to shift position and stand as needed whenever he wishes.    Resume Activity: April 2.        _________________________  Regina Tolentino PA-C

## 2018-03-20 ENCOUNTER — OFFICE VISIT (OUTPATIENT)
Dept: SLEEP MEDICINE | Facility: CLINIC | Age: 58
End: 2018-03-20
Attending: NURSE PRACTITIONER
Payer: COMMERCIAL

## 2018-03-20 VITALS
DIASTOLIC BLOOD PRESSURE: 76 MMHG | WEIGHT: 236 LBS | OXYGEN SATURATION: 98 % | HEART RATE: 67 BPM | RESPIRATION RATE: 12 BRPM | HEIGHT: 71 IN | SYSTOLIC BLOOD PRESSURE: 117 MMHG | BODY MASS INDEX: 33.04 KG/M2

## 2018-03-20 DIAGNOSIS — R06.81 WITNESSED EPISODE OF APNEA: ICD-10-CM

## 2018-03-20 DIAGNOSIS — R06.83 SNORING: ICD-10-CM

## 2018-03-20 DIAGNOSIS — E66.811 OBESITY (BMI 30.0-34.9): ICD-10-CM

## 2018-03-20 DIAGNOSIS — G47.9 SLEEP DISTURBANCE: Primary | ICD-10-CM

## 2018-03-20 PROCEDURE — 99244 OFF/OP CNSLTJ NEW/EST MOD 40: CPT | Performed by: INTERNAL MEDICINE

## 2018-03-20 NOTE — PROGRESS NOTES
[facility]  Outpatient Sleep Medicine Consultation  March 20, 2018      Name: Bud Chiang MRN# [id number]   Age: 58 year old YOB: 1960     Date of Consultation: March 20, 2018  Consultation is requested by: COREY Dickerson CNP  [address] [place] [address]  Primary care provider: Pallas, Kenneth G  [facility] clinic: [facility]       Reason for Sleep Consult:     Bud Chiang is a 58 year old male nightly witnessed apnea, snoring, gasping, choking, poor quality of sleep and excessive daytime sleepiness and tiredness.         Assessment and Plan:     Summary Sleep Diagnoses/Recommendations:    1. Sleep Disturbance/hypersomnolence:  High suspicion of sleep disordered breathing based on patient's symptoms (snoring, excessive daytime sleepiness, witnessed apneas), high BMI, neck circumference and oropharyngeal examination. Will schedule Home sleep study. We also discussed the pathophysiology of sleep disordered breathing and the importance of treating it if S/he should have it. Patient is advised not to drive if he/she feels drowsy or sleepy.  Follow up after sleep study to discuss the result of sleep study and treatment options.    2. Obesity:  Counseled regarding weight loss through diet modification and increased physical activity. Patient was given instuctions of weight loss and advised to follow up her PCP for further weight loss interventions.        Orders Placed This Encounter   Procedures     HST-Home Sleep Apnea Test       Summary Counseling:  See instructions    Counseling included a comprehensive review of diagnostic and therapeutic strategies as well as risks of inadequate therapy.  Educational materials provided in instructions.    All questions were answered.  The patient indicates understanding of the above issues and agrees with the plan set forth.           History of Present Illness:     Bud Chiang is a 58 year old male  with history of depression, hyperlipidemia, lumbar stenosis and herniated disc s/p surgery who presents to the [facility] in [place] with complains of sleep disturbance. I was asked to see Mr. Chiang regarding snoring and witnessed apnea by Dr. Pallas.   Patient complains loud snoring (worse when he sleeps in his back), witnessed apnea, waking up gasping air and choking, restless sleep, excessive daytime sleepiness and tiredness, morning headache and dry mouth and throat for 15 years. He denies difficulty falling and staying asleep, restless legs and acid reflux. He has recent back surgery for lumbar stenosis and herniated disc and pneumonia. He denies difficulty falling but occasional difficulty staying asleep, wakes up twice for bathroom..    Please see below for sleep ROS details.    PREVIOUS IN- LAB or HOME SLEEP STUDIES:  None     SLEEP-WAKE SCHEDULE:     Bud Chiang     -Describes themself as neither a morning or night person;      -ON WEEKDAYS, goes to sleep at 10:30 PM during the week; awakens  5:30 AM with an alarm; falls asleep in 5 minutes; denies difficulty falling asleep.     -ON WEEKENDS, goes to sleep at 11:30 PM and wakes up at 7:00 AM without an alarm; falls asleep in 5 minutes.       -Awakens 2 times a night for 10 minutes before falling back to sleep; awakens to go to the bathroom.      -Total sleep time: 6-8 hours per night.    -Naps 3 times/days per week for 60 minutes, feels refreshed after naps; takes no inadvertant naps.       BEDTIME ACTIVITIES AND SHIFT WORK:    Bud Chiang    -does use electronics in bed and does not watch TV in bed and read in bed.     -does not do shift work.  He/she works day shifts.      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[employer]       SLEEP APNEA: Stopbang score: 6       INSOMNIA:  Insomnia severity score: N/A       SLEEPINESS: Austin sleepiness scale (ESS): 12   Drowsy driving/near accidents: No          PHQ9: N/A    SLEEP COMPLAINTS:    Snoring- 7 days/week  Witness apnea: Yes  Gasping/Choking: Yes  Excessive daytime sleep: Yes  Toss/turn: No  Excessive tiredness/fatigue:  Yes  Morning headaches: Yes  Dry mouth/throat: Yes  Dyspnea: No  Coexisting Lung disease: No    Coexisting Heart disease: No    Does patient have a bed partner: Yes  Has bed partner been sleeping separately because of snoring:  No            RLS Screen: When you try to relax in the evening or sleep at  night, do you ever have unpleasant, restless feelings in your  legs that can be relieved by walking or movement? No    Periodic limb movement: No    Narcolepsy:       denies sudden urges of sleep attacks     denies cataplexy     denies sleep paralysis      denies hallucinations     Sleep Behaviors:     denies leg symptoms/movements     denies motor restlessness     denies night terrors     denies bruxism     denies automatic behaviors    Other subjective complaints:     denies anxiety or rumination      denies pain and discomfort at  night     denies waking up with heart pounding or racing     denies GERD/heartburn         Parasomnia:   NREM - denies recurrent persistent confusional arousal, night eating, sleep walking or sleep terrors   REM  - denies dream enactment; no injuries. Occasional vivid dreams/nightmares.    Safety: None             Medications:     Current Outpatient Prescriptions   Medication Sig     ibuprofen (ADVIL/MOTRIN) 200 MG tablet Take 600 mg by mouth every 4 hours as needed for mild pain or moderate pain     Acetaminophen (TYLENOL PO) Take 1,000 mg by mouth every 8 hours as needed for mild pain or fever     SIMVASTATIN PO Take 20 mg by mouth At Bedtime     BuPROPion HCl (WELLBUTRIN PO) Take 150 mg by mouth 2 times daily      No current facility-administered medications for this visit.         Medication that can affect sleep: Bupropion     Allergies   Allergen Reactions     Eggs [Chicken-Derived Products (Egg)] GI Disturbance            Past Medical History:      Does not need 02 supplement at night     Past Medical History:   Diagnosis Date     Depression      Depressive disorder      Hyperlipidemia      Left ear hearing loss      Lumbar disc herniation      Lumbar radiculopathy      Lumbar stenosis      Obese                Past Surgical History:    Yes previous upper airway surgery     Past Surgical History:   Procedure Laterality Date     HEMILAMINECTOMY, DISCECTOMY LUMBAR ONE LEVEL, COMBINED N/A 1/26/2018    Procedure: COMBINED HEMILAMINECTOMY, DISCECTOMY LUMBAR ONE LEVEL;  Open Left Lumbar 4-5 Hemilaminectomy And Microdiscectomy,  Bilateral Approach;  Surgeon: Lilly Tijerina MD;  Location: [facility] OR     MOUTH SURGERY      wisdom teeth     ORTHOPEDIC SURGERY Left 2001    left patella resection     TONSILLECTOMY              Social History:     Social History   Substance Use Topics     Smoking status: Never Smoker     Smokeless tobacco: Never Used     Alcohol use 6.0 oz/week     10 Cans of beer per week      Comment: WEEKLY         Chemical History:     Tobacco: No     Uses 1 sodas/day, 1 energy drinks/day. Last caffeine intake is usually before 10 am    EtOH: Yes  Recreational Drugs: No    Psych Hx:   Depression       Current dangers to self or others: None           Family History:     Family History   Problem Relation Age of Onset     Breast Cancer Mother      Lung Cancer Mother      mets to the liver     Hyperlipidemia Mother      CANCER Mother      Brain Cancer Father      CANCER Father      Brain Tumor Father      Hyperlipidemia Sister      Hyperlipidemia Brother      Coronary Artery Disease Brother      Myocardial Infarction Brother      Myocardial Infarction Maternal Grandfather      Heart Failure Paternal Half-Sister      HEART DISEASE Paternal Half-Brother         Sleep Family Hx:        RLS- No  DIRK - No, ? father  Insomnia - No  Parasomnia - No         Review of Systems:     A complete 10 point review of systems was negative other than HPI or as  "commented below:   Patient denies chest pain,  wheezing, abdominal pain, n&v, fever, chills, dysuria, leg pain or swelling. Patient is also denies ear pain, sore throat, postnasal drip, running nose, dry cough.  Patient has vision changes, headaches, numbness of ext, dyspnea with activity and muscle pain.    Bud Chiang has gained 15 pounds in the last year.            Physical Examination:   /76  Pulse 67  Resp 12  Ht 1.815 m (5' 11.46\")  Wt 107 kg (236 lb)  SpO2 98%  BMI 32.5 kg/m2     Neck Circumference:45 cm   Constitutional: . Awake, alert, cooperative, in no apparent distress  Mood: euthymic; affect congruent with full range and intensity.  Attention/Concentration:  Normal   Eyes: Pupils round and reactive. No icterus.  ENT: Mallampati Class: IV.   Tonsillar Stage: 0  surgically removed  Clear nasal passages. Enlarged inferior turbinates. No deviated septum.  Oropharynx: No high arched palate. No pharyngeal erythema or exudates, elongated uvula. No lateral narrowing  Tongue: No relative macroglossia   Dentition: Good with grind down frontal teeth.  Dentures: None  Neck: Supple, no thyroid enlargement.   Cardiovascular: Regular S1 and S2, no murmurs or gallops.    Pulmonary:  Chest symmetric, lungs clear bilaterally and no crackles, wheezes or rales.  Abdomen: Soft, obese, non tender.  Extremities:  No pedal edema.  Muscle/joint: Strength and tone normal   Skin:  No rash or significant lesions examined areas of skin.   Neurologic: Alert, oriented x3, no focal neurological deficit.           Data: All pertinent previous laboratory data reviewed     No results found for: PH, PHARTERIAL, PO2, NW0IDDOICUO, SAT, PCO2, HCO3, BASEEXCESS, LAYLA, BEB  No results found for: TSH  Lab Results   Component Value Date    GLC 89 01/25/2018     Lab Results   Component Value Date    HGB 15.6 01/25/2018     Lab Results   Component Value Date    BUN 20 01/25/2018    CR 1.00 01/25/2018     Lab Results   Component " Value Date    CO2 30 01/25/2018     No results found for: EVE      Echocardiography: No    Chest x-ray: 1/25/2018 10:57 AM  Impression:   No acute cardiopulmonary abnormalities.    PFT: No        Copy to: Pallas, Kenneth G  Copy to: Lashonda Tovar MD 3/20/2018   [facility]  [address], [place] [address] [phone] Clinic    Total time spent with patient: 41 minutes with this patient today in which 25 minutes was spent in counseling/coordination of care and going over planned testing and recommendations.

## 2018-03-20 NOTE — PATIENT INSTRUCTIONS
"MY TREATMENT INFORMATION FOR SLEEP DISTURBANCE-  Bud Chiang    DOCTOR : Vishnu Tovar MD  SLEEP CENTER :  [place]  MY CONTACT NUMBER:[phone]        If I haven't had a sleep study yet, what can I expect?  A personal story from Ziyad  https://www.CohesiveFTube.com/watch?v=AxPLmlRpnCs    Am I having a home sleep study?  Here is a video in case you get home and want to make sure you have done it correctly  https://www.CohesiveFTube.com/watch?v=XCU5P8lGok9&feature=youtu.be    Suspected sleep apnea: Sleep study ordered.    Follow up in sleep clinic 1-2 weeks after sleep study to discuss results of sleep study and treatment options.    Patient was advised not to drive if drowsy or sleepy.    Frequently asked questions:  1. What is Obstructive Sleep Apnea (DIRK)? DIRK is the most common type of sleep apnea. Apnea literally means, \"without breath.\" It is characterized by repetitive pauses in breathing, despite continued effort to breathe, and is usually associated with a reduction in blood oxygen saturation. Apneas can last 10 to over 60 seconds. It is caused by narrowing or collapse of the upper airway as muscles relax during sleep. Severity of sleep apnea is determined by frequency of breathing events and their effect on your sleep and oxygen levels determined during sleep testing.   2. What are the consequences of DIRK? Symptoms include: daytime sleepiness- possibly increasing the risk of falling asleep while driving, unrefreshing/restless sleep, snoring, insomnia, waking frequently to urinate, waking with heartburn or reflux, reduced concentration and memory, and morning headaches. Other health consequences may include development of high blood pressure and other cardiovascular disease in persons who are susceptible. Untreated DIRK  can contribute to heart disease, stroke and diabetes.   3. What are the treatment options? In most situations, sleep apnea is a lifelong disease that must be managed with daily therapy. " Medications are not effective for sleep apnea and surgery is generally not performed until other therapies have been tried. Therapy is usually tailored to the individual patient based on many factors including your wishes as well as severity of sleep apnea and severity of obesity. Continuous Positive Airway (CPAP) is the most reliable treatment. An oral device to hold your jaw forward is usually the next most reliable option. Other options include postioning devices (to keep you off your back), weight loss, and surgery including a tongue pacing device. There is more detail about some of these options below.    Central sleep apnea is a disorder in which your breathing repeatedly stops and starts during sleep.  Central sleep apnea occurs because your brain doesn't send proper signals to the muscles that control your breathing. This condition is different from obstructive sleep apnea, in which you can't breathe normally because of upper airway obstruction. Central sleep apnea is less common than obstructive sleep apnea.  Central sleep apnea may occur as a result of other conditions, such as heart failure and stroke. Sleeping at a high altitude and pain medications also may cause central sleep apnea.        1. CPAP-  WHAT DOES IT DO AND HOW CAN I LEARN TO WEAR IT?                               BEFORE I START, CAN I WATCH A MOVIE TO GET A PLAN ON HOW TO USE CPAP?  https://www.SKURA.com/watch?g=e9M43fs924K      Continuous positive airway pressure, or CPAP, is the most effective treatment for obstructive sleep apnea. It works by blowing room air, through a mask, to hold your throat open. A decision to use CPAP is a major step forward in the pursuit of a healthier life. The successful use of CPAP will help you breathe easier, sleep better and live healthier. You can choose CPAP equipment from any durable medical equipment provider that meets your needs.  Using CPAP can be a positive experience if you keep these key points  "in mind:  1. Commitment  CPAP is not a quick fix for your problem. It involves a long-term commitment to improve your sleep and your health.    2. Communication  Stay in close communication with both your sleep doctor and your CPAP supplier. Ask lots of questions and seek help when you need it.    3. Consistency  Use CPAP all night, every night and for every nap. You will receive the maximum health benefits from CPAP when you use it every time that you sleep. This will also make it easier for your body to adjust to the treatment.    4. Correction  The first machine and mask that you try may not be the best ones for you. Work with your sleep doctor and your CPAP supplier to make corrections to your equipment selection. Ask about trying a different type of machine or mask if you have ongoing problems. Make sure that your mask is a good fit and learn to use your equipment properly.    5. Challenge  Tell a family member or close friend to ask you each morning if you used your CPAP the previous night. Have someone to challenge you to give it your best effort.    6. Connection   Your adjustment to CPAP will be easier if you are able to connect with others who use the same treatment. Ask your sleep doctor if there is a support group in your area for people who have sleep apnea, or look for one on the Internet.  7. Comfort   Increase your level of comfort by using a saline spray, decongestant or heated humidifier if CPAP irritates your nose, mouth or throat. Use your unit's \"ramp\" setting to slowly get used to the air pressure level. There may be soft pads you can buy that will fit over your mask straps. Look on www.CPAP.com for accessories that can help make CPAP use more comfortable.  8. Cleaning   Clean your mask, tubing and headgear on a regular basis. Put this time in your schedule so that you don't forget to do it. Check and replace the filters for your CPAP unit and humidifier.    9. Completion   Although you are " never finished with CPAP therapy, you should reward yourself by celebrating the completion of your first month of treatment. Expect this first month to be your hardest period of adjustment. It will involve some trial and error as you find the machine, mask and pressure settings that are right for you.    10. Continuation  After your first month of treatment, continue to make a daily commitment to use your CPAP all night, every night and for every nap.    CPAP-Tips to starting with success:  Begin using your CPAP for short periods of time during the day while you watch TV or read.    Use CPAP every night and for every nap. Using it less often reduces the health benefits and makes it harder for your body to get used to it.    Make small adjustments to your mask, tubing, straps and headgear until you get the right fit. Tightening the mask may actually worsen the leak.  If it leaves significant marks on your face or irritates the bridge of your nose, it may not be the best mask for you.  Speak with the person who supplied the mask and consider trying other masks. Insurances will allow you to try different masks during the first month of starting CPAP.  Insurance also covers a new mask, hose and filter about every 6 months.    Use a saline nasal spray to ease mild nasal congestion. Neti-Pot or saline nasal rinses may also help. Nasal gel sprays can help reduce nasal dryness.  Biotene mouthwash can be helpful to protect your teeth if you experience frequent dry mouth.  Dry mouth may be a sign of air escaping out of your mouth or out of the mask in the case of a full face mask.  Speak with your provider if you expect that is the case.     Take a nasal decongestant to relieve more severe nasal or sinus congestion.  Do not use Afrin (oxymetazoline) nasal spray more than 3 days in a row.  Speak with your sleep doctor if your nasal congestion is chronic.    Use a heated humidifier that fits your CPAP model to enhance your  breathing comfort. Adjust the heat setting up if you get a dry nose or throat, down if you get condensation in the hose or mask.  Position the CPAP lower than you so that any condensation in the hose drains back into the machine rather than towards the mask.    Try a system that uses nasal pillows if traditional masks give you problems.    Clean your mask, tubing and headgear once a week. Make sure the equipment dries fully.    Regularly check and replace the filters for your CPAP unit and humidifier.    Work closely with your sleep provider and your CPAP supplier to make sure that you have the machine, mask and air pressure setting that works best for you. It is better to stop using it and call your provider to solve problems than to lay awake all night frustrated with the device.    BESIDES CPAP, WHAT OTHER THERAPIES ARE THERE?      Positioning Device  Positioning devices are generally used when sleep apnea is mild and only occurs on your back.This example shows a pillow that straps around the waist. It may be appropriate for those whose sleep study shows milder sleep apnea that occurs primarily when lying flat on one's back. Preliminary studies have shown benefit but effectiveness at home may need to be verified by a home sleep test. These devices are generally not covered by medical insurance.                      Oral Appliance  What is oral appliance therapy?  An oral appliance is a small acrylic device that fits over the upper and lower teeth or tongue (similar to an orthodontic retainer or a mouth guard). This device slightly advances the lower jaw or tongue, which moves the base of the tongue forward, opens the airway, improves breathing and can effectively treat snoring and obstructive sleep apnea sleep apnea. The appliance is fabricated and customized by a qualified dentist with experience in treating snoring and sleep apnea. Oral appliances are usually well tolerated and have relatively high compliance  by patients1, 2, 3.  When is an oral appliance indicated?  Oral appliance therapy is recommended as a first-line treatment for patients with primary snoring, mild sleep apnea, and for patients with moderate sleep apnea who prefer appliance therapy to use of CPAP4, 5. Severity of sleep apnea is determined by sleep testing and is based on the number of respiratory events per hour of sleep.   How successful is oral appliance therapy?  The success rate of oral appliance therapy in patients with mild sleep apnea is 75-80% while in patients with moderate sleep apnea it is 50-70%. The chance of success in patients with severe sleep apnea is 40-50%. The research also shows that oral appliances have a beneficial effect on the cardiovascular health of DIRK patients at the same magnitude as CPAP therapy7.  Oral appliances should be a second-line treatment in cases of severe sleep apnea, but if not completely successful then a combination therapy utilizing CPAP plus oral appliance therapy may be effective. Oral appliances tend to be effective in a broad range of patients although studies show that the patients who have the highest success are females, younger patients, those with milder disease, and less severe obesity. 3, 6.   The chances of success are lower in patients who have more severe DIRK, are older, and those who are morbidly obese.     Example of an oral appliance   Finding a dentist that practices dental sleep medicine  Specific training is available through the American Academy of Dental Sleep Medicine for dentists interested in working in the field of sleep. To find a dentist who is educated in the field of sleep and the use of oral appliances, near you, visit the Web site of the American Academy of Dental Sleep Medicine; also see   http://www.accpstorage.org/newOrganization/patients/oralAppliances.pdf  To search for a dentist certified in these practices:  Http://aadsm.org/FindADentist.aspx?1  1. yonatan Browne al.  Objectively measured vs self-reported compliance during oral appliance therapy for sleep-disordered breathing. Chest 2013; 144(5): 1709-6608.  2. To, et al. Objective measurement of compliance during oral appliance therapy for sleep-disordered breathing. Thorax 2013; 68(1): 91-96.  3. Bruce et al. Mandibular advancement devices in 620 men and women with DIRK and snoring: tolerability and predictors of treatment success. Chest 2004; 125: 4637-9631.  4. Madeline et al. Oral appliances for snoring and DIRK: a review. Sleep 2006; 29: 244-262.  5. Juaquin et al. Oral appliance treatment for DIRK: an update. J Clin Sleep Med 2014; 10(2): 215-227.  6. Tyler et al. Predictors of OSAH treatment outcome. J Dent Res 2007; 86: 7864-8034.    Nasal Valves                 Nasal valves may not be effective if you have frequent nasal congestion or have difficulty breathing through your nose. They may be an option for mild apnea if other options are not well tolerated. The efficacy of these devices is generally less than CPAP or oral appliances.      Weight Loss:    Weight management is a personal decision.  If you are interested in exploring weight loss strategies, the following discussion covers the impact on weight loss on sleep apnea and the approaches that may be successful.    Weight loss decreases severity of sleep apnea in most people with obesity. For those with mild obesity who have developed snoring with weight gain, even 15-30 pound weight loss can improve and occasionally eliminate sleep apnea.  Structured and life-long dietary and health habits are necessary to lose weight and keep healthier weight levels.     Though there may be significant health benefits from weight loss, long-term weight loss is very difficult to achieve- studies show success with dietary management in less than 10% of people. In addition, substantial weight loss may require years of dietary control and may be difficult if patients  have severe obesity. In these cases, surgical management may be considered.  Finally, older individuals who have tolerated obesity without health complications may be less likely to benefit from weight loss strategies.    Your BMI is Body mass index is 32.5 kg/(m^2).  Body mass index (BMI) is one way to tell whether you are at a healthy weight, overweight, or obese. It measures your weight in relation to your height.  A BMI of 18.5 to 24.9 is in the healthy range. A person with a BMI of 25 to 29.9 is considered overweight, and someone with a BMI of 30 or greater is considered obese. More than two-thirds of American adults are considered overweight or obese.  Being overweight or obese increases the risk for further weight gain. Excess weight may lead to heart disease and diabetes.  Creating and following plans for healthy eating and physical activity may help you improve your health.  Weight control is part of healthy lifestyle and includes exercise, emotional health, and healthy eating habits. Careful eating habits lifelong are the mainstay of weight control. Though there are significant health benefits from weight loss, long-term weight loss with diet alone may be very difficult to achieve- studies show long-term success with dietary management in less than 10% of people. Attaining a healthy weight may be especially difficult to achieve in those with severe obesity. In some cases, medications, devices and surgical management might be considered.  What can you do?  If you are overweight or obese and are interested in methods for weight loss, you should discuss this with your provider.     Consider reducing daily calorie intake by 500 calories.     Keep a food journal.     Avoiding skipping meals, consider cutting portions instead.    Diet combined with exercise helps maintain muscle while optimizing fat loss. Strength training is particularly important for building and maintaining muscle mass. Exercise helps reduce  stress, increase energy, and improves fitness. Increasing exercise without diet control, however, may not burn enough calories to loose weight.       Start walking three days a week 10-20 minutes at a time    Work towards walking thirty minutes five days a week     Eventually, increase the speed of your walking for 1-2 minutes at time    In addition, we recommend that you review healthy lifestyles and methods for weight loss available through the National Institutes of Health patient information sites:  http://win.niddk.nih.gov/publications/index.htm    And look into health and wellness programs that may be available through your health insurance provider, employer, local community center, or [employer].    Weight management plan: Patient was referred to their PCP to discuss a diet and exercise plan.    Surgery:    Upper Airway Surgery for DIRK  Surgery for DIRK is a second-line treatment option in the management of sleep apnea.  Surgery should be considered for patients who are having a difficult time tolerating CPAP.    Surgery for DIRK is directed at areas that are responsible for narrowing or complete obstruction of the airway during sleep.  There are a wide range of procedures available to enlarge and/or stabilize the airway to prevent blockage of breathing in the three major areas where it can occur: the palate, tongue, and nasal regions.  Successful surgical treatment depends on the accurate identification of the factors responsible for obstructive sleep apnea in each person.  A personalized approach is required because there is no single treatment that works well for everyone.  Because of anatomic variation, consultation with an examination by a sleep surgeon is a critical first step in determining what surgical options are best for each patient.  In some cases, examination during sedation may be recommended in order to guide the selection of procedures.  Patients will be counseled about risks and benefits as  well as the typical recovery course after surgery. Surgery is typically not a cure for a person s DIRK.  However, surgery will often significantly improve one s DIRK severity (termed  success rate ).  Even in the absence of a cure, surgery will decrease the cardiovascular risk associated with OSA7; improve overall quality of life8 (sleepiness, functionality, sleep quality, etc).          Palate Procedures:  Patients with DIRK often have narrowing of their airway in the region of their tonsils and uvula.  The goals of palate procedures are to widen the airway in this region as well as to help the tissues resist collapse.  Modern palate procedure techniques focus on tissue conservation and soft tissue rearrangement, rather than tissue removal.  Often the uvula is preserved in this procedure. Residual sleep apnea is common in patient after pharyngoplasty with an average reduction in sleep apnea events of 33%2.      Tongue Procedures:  While patients are awake, the muscles that surround the throat are active and keep this region open for breathing. These muscles relax during sleep, allowing the tongue and other structures to collapse and block breathing.  There are several different tongue procedures available.  Selection of a tongue base procedure depends on characteristics seen on physical exam.  Generally, procedures are aimed at removing bulky tissues in this area or preventing the back of the tongue from falling back during sleep.  Success rates for tongue surgery range from 50-62%3.    Hypoglossal Nerve Stimulation:  Hypoglossal nerve stimulation has recently received approval from the [place] Food and Drug Administration for the treatment of obstructive sleep apnea.  This is based on research showing that the system was safe and effective in treating sleep apnea6.  Results showed that the median AHI score decreased 68%, from 29.3 to 9.0. This therapy uses an implant system that senses breathing patterns and  delivers mild stimulation to airway muscles, which keeps the airway open during sleep.  The system consists of three fully implanted components: a small generator (similar in size to a pacemaker), a breathing sensor, and a stimulation lead.  Using a small handheld remote, a patient turns the therapy on before bed and off upon awakening.    Candidates for this device must be greater than 22 years of age, have moderate to severe DIRK (AHI between 20-65), BMI less than 32, have tried CPAP/oral appliance without success, and have appropriate upper airway anatomy (determined by a sleep endoscopy performed by Dr. Hopper).    Hypoglossal Nerve Stimulation Pathway:    The sleep surgeon s office will work with the patient through the insurance prior-authorization process (including communications and appeals).    Nasal Procedures:  Nasal obstruction can interfere with nasal breathing during the day and night.  Studies have shown that relief of nasal obstruction can improve the ability of some patients to tolerate positive airway pressure therapy for obstructive sleep apnea1.  Treatment options include medications such as nasal saline, topical corticosteroid and antihistamine sprays, and oral medications such as antihistamines or decongestants. Non-surgical treatments can include external nasal dilators for selected patients. If these are not successful by themselves, surgery can improve the nasal airway either alone or in combination with these other options.      Combination Procedures:  Combination of surgical procedures and other treatments may be recommended, particularly if patients have more than one area of narrowing or persistent positional disease.  The success rate of combination surgery ranges from 66-80%2,3.      1. Mike MARIE. The Role of the Nose in Snoring and Obstructive Sleep Apnoea: An Update.  Eur Arch Otorhinolaryngol. 2011; 268: 1365-73.  2.  Antoinette SM; Chuckie JO; Lolita TIERNEY; Pallanch JF; Trista BEY; Carolina  SG; Epi PEÑA. Surgical modifications of the upper airway for obstructive sleep apnea in adults: a systematic review and meta-analysis. SLEEP 2010;33(10):4001-3564. Danny PONCE. Hypopharyngeal surgery in obstructive sleep apnea: an evidence-based medicine review.  Arch Otolaryngol Head Neck Surg. 2006 Feb;132(2):206-13.  3. Jeff YH1, Bailee Y, Blair SEAN. The efficacy of anatomically based multilevel surgery for obstructive sleep apnea. Otolaryngol Head Neck Surg. 2003 Oct;129(4):327-35.  4. Danny PONEC, Goldberg A. Hypopharyngeal Surgery in Obstructive Sleep Apnea: An Evidence-Based Medicine Review. Arch Otolaryngol Head Neck Surg. 2006 Feb;132(2):206-13.  5. Nikita PUENTE et al. Upper-Airway Stimulation for Obstructive Sleep Apnea.  N Engl J Med. 2014 Jan 9;370(2):139-49.  6. Sonya Y et al. Increased Incidence of Cardiovascular Disease in Middle-aged Men with Obstructive Sleep Apnea. Am J Respir Crit Care Med; 2002 166: 159-165  7. Ramos EM et al. Studying Life Effects and Effectiveness of Palatopharyngoplasty (SLEEP) study: Subjective Outcomes of Isolated Uvulopalatopharyngoplasty. Otolaryngol Head Neck Surg. 2011; 144: 623-631.  8.   Your blood pressure was checked while you were in clinic today.  Please read the guidelines below about what these numbers mean and what you should do about them.  Your systolic blood pressure is the top number.  This is the pressure when the heart is pumping.  Your diastolic blood pressure is the bottom number.  This is the pressure in between beats.  If your systolic blood pressure is less than 120 and your diastolic blood pressure is less than 80, then your blood pressure is normal. There is nothing more that you need to do about it  If your systolic blood pressure is 120-139 or your diastolic blood pressure is 80-89, your blood pressure may be higher than it should be.  You should have your blood pressure re-checked within a year by a primary care provider.  If your systolic blood  pressure is 140 or greater or your diastolic blood pressure is 90 or greater, you may have high blood pressure.  High blood pressure is treatable, but if left untreated over time it can put you at risk for heart attack, stroke, or kidney failure.  You should have your blood pressure re-checked by a primary care provider within the next four weeks.

## 2018-03-20 NOTE — NURSING NOTE
"Chief Complaint   Patient presents with     Consult     loud snoring, and restless sleep, woke up gasping, wife has witnessed pauses in sleep       Initial /76  Pulse 67  Resp 12  Ht 1.815 m (5' 11.46\")  Wt 107 kg (236 lb)  SpO2 98%  BMI 32.5 kg/m2 Estimated body mass index is 32.5 kg/(m^2) as calculated from the following:    Height as of this encounter: 1.815 m (5' 11.46\").    Weight as of this encounter: 107 kg (236 lb).  Medication Reconciliation: complete     ESS 12  Neck circumference 45 cm, 17.75 inches    [facility] -Specialist  "

## 2018-03-20 NOTE — MR AVS SNAPSHOT
"              After Visit Summary   3/20/2018    Bud Chiang    MRN: [id number]           Patient Information     Date Of Birth          1960        Visit Information        Provider Department      3/20/2018 10:00 AM Vishnu Tovar MD [facility] [place]        Today's Diagnoses     Sleep disturbance    -  1    Snoring        Witnessed episode of apnea          Care Instructions    MY TREATMENT INFORMATION FOR SLEEP DISTURBANCE-  Bud KITCHEN Андрей    DOCTOR : Vishnu Tovar MD  SLEEP CENTER :  [place]  MY CONTACT NUMBER:[phone]        If I haven't had a sleep study yet, what can I expect?  A personal story from [employer]  https://www.FoodBuzz.com/watch?v=AxPLmlRpnCs    Am I having a home sleep study?  Here is a video in case you get home and want to make sure you have done it correctly  https://www.FoodBuzz.com/watch?v=FMW8J8xLwn1&feature=youtu.be    Suspected sleep apnea: Sleep study ordered.    Follow up in sleep clinic 1-2 weeks after sleep study to discuss results of sleep study and treatment options.    Patient was advised not to drive if drowsy or sleepy.    Frequently asked questions:  1. What is Obstructive Sleep Apnea (DIRK)? DIRK is the most common type of sleep apnea. Apnea literally means, \"without breath.\" It is characterized by repetitive pauses in breathing, despite continued effort to breathe, and is usually associated with a reduction in blood oxygen saturation. Apneas can last 10 to over 60 seconds. It is caused by narrowing or collapse of the upper airway as muscles relax during sleep. Severity of sleep apnea is determined by frequency of breathing events and their effect on your sleep and oxygen levels determined during sleep testing.   2. What are the consequences of DIRK? Symptoms include: daytime sleepiness- possibly increasing the risk of falling asleep while driving, unrefreshing/restless sleep, snoring, insomnia, waking frequently to urinate, waking with " heartburn or reflux, reduced concentration and memory, and morning headaches. Other health consequences may include development of high blood pressure and other cardiovascular disease in persons who are susceptible. Untreated DIRK  can contribute to heart disease, stroke and diabetes.   3. What are the treatment options? In most situations, sleep apnea is a lifelong disease that must be managed with daily therapy. Medications are not effective for sleep apnea and surgery is generally not performed until other therapies have been tried. Therapy is usually tailored to the individual patient based on many factors including your wishes as well as severity of sleep apnea and severity of obesity. Continuous Positive Airway (CPAP) is the most reliable treatment. An oral device to hold your jaw forward is usually the next most reliable option. Other options include postioning devices (to keep you off your back), weight loss, and surgery including a tongue pacing device. There is more detail about some of these options below.    Central sleep apnea is a disorder in which your breathing repeatedly stops and starts during sleep.  Central sleep apnea occurs because your brain doesn't send proper signals to the muscles that control your breathing. This condition is different from obstructive sleep apnea, in which you can't breathe normally because of upper airway obstruction. Central sleep apnea is less common than obstructive sleep apnea.  Central sleep apnea may occur as a result of other conditions, such as heart failure and stroke. Sleeping at a high altitude and pain medications also may cause central sleep apnea.        1. CPAP-  WHAT DOES IT DO AND HOW CAN I LEARN TO WEAR IT?                               BEFORE I START, CAN I WATCH A MOVIE TO GET A PLAN ON HOW TO USE CPAP?  https://www.youWallCompass.com/watch?v=x5I45pt847W      Continuous positive airway pressure, or CPAP, is the most effective treatment for obstructive sleep  "apnea. It works by blowing room air, through a mask, to hold your throat open. A decision to use CPAP is a major step forward in the pursuit of a healthier life. The successful use of CPAP will help you breathe easier, sleep better and live healthier. You can choose CPAP equipment from any durable medical equipment provider that meets your needs.  Using CPAP can be a positive experience if you keep these jeffries points in mind:  1. Commitment  CPAP is not a quick fix for your problem. It involves a long-term commitment to improve your sleep and your health.    2. Communication  Stay in close communication with both your sleep doctor and your CPAP supplier. Ask lots of questions and seek help when you need it.    3. Consistency  Use CPAP all night, every night and for every nap. You will receive the maximum health benefits from CPAP when you use it every time that you sleep. This will also make it easier for your body to adjust to the treatment.    4. Correction  The first machine and mask that you try may not be the best ones for you. Work with your sleep doctor and your CPAP supplier to make corrections to your equipment selection. Ask about trying a different type of machine or mask if you have ongoing problems. Make sure that your mask is a good fit and learn to use your equipment properly.    5. Challenge  Tell a family member or close friend to ask you each morning if you used your CPAP the previous night. Have someone to challenge you to give it your best effort.    6. Connection   Your adjustment to CPAP will be easier if you are able to connect with others who use the same treatment. Ask your sleep doctor if there is a support group in your area for people who have sleep apnea, or look for one on the Internet.  7. Comfort   Increase your level of comfort by using a saline spray, decongestant or heated humidifier if CPAP irritates your nose, mouth or throat. Use your unit's \"ramp\" setting to slowly get used to " the air pressure level. There may be soft pads you can buy that will fit over your mask straps. Look on www.CPAP.com for accessories that can help make CPAP use more comfortable.  8. Cleaning   Clean your mask, tubing and headgear on a regular basis. Put this time in your schedule so that you don't forget to do it. Check and replace the filters for your CPAP unit and humidifier.    9. Completion   Although you are never finished with CPAP therapy, you should reward yourself by celebrating the completion of your first month of treatment. Expect this first month to be your hardest period of adjustment. It will involve some trial and error as you find the machine, mask and pressure settings that are right for you.    10. Continuation  After your first month of treatment, continue to make a daily commitment to use your CPAP all night, every night and for every nap.    CPAP-Tips to starting with success:  Begin using your CPAP for short periods of time during the day while you watch TV or read.    Use CPAP every night and for every nap. Using it less often reduces the health benefits and makes it harder for your body to get used to it.    Make small adjustments to your mask, tubing, straps and headgear until you get the right fit. Tightening the mask may actually worsen the leak.  If it leaves significant marks on your face or irritates the bridge of your nose, it may not be the best mask for you.  Speak with the person who supplied the mask and consider trying other masks. Insurances will allow you to try different masks during the first month of starting CPAP.  Insurance also covers a new mask, hose and filter about every 6 months.    Use a saline nasal spray to ease mild nasal congestion. Neti-Pot or saline nasal rinses may also help. Nasal gel sprays can help reduce nasal dryness.  Biotene mouthwash can be helpful to protect your teeth if you experience frequent dry mouth.  Dry mouth may be a sign of air escaping out  of your mouth or out of the mask in the case of a full face mask.  Speak with your provider if you expect that is the case.     Take a nasal decongestant to relieve more severe nasal or sinus congestion.  Do not use Afrin (oxymetazoline) nasal spray more than 3 days in a row.  Speak with your sleep doctor if your nasal congestion is chronic.    Use a heated humidifier that fits your CPAP model to enhance your breathing comfort. Adjust the heat setting up if you get a dry nose or throat, down if you get condensation in the hose or mask.  Position the CPAP lower than you so that any condensation in the hose drains back into the machine rather than towards the mask.    Try a system that uses nasal pillows if traditional masks give you problems.    Clean your mask, tubing and headgear once a week. Make sure the equipment dries fully.    Regularly check and replace the filters for your CPAP unit and humidifier.    Work closely with your sleep provider and your CPAP supplier to make sure that you have the machine, mask and air pressure setting that works best for you. It is better to stop using it and call your provider to solve problems than to lay awake all night frustrated with the device.    BESIDES CPAP, WHAT OTHER THERAPIES ARE THERE?      Positioning Device  Positioning devices are generally used when sleep apnea is mild and only occurs on your back.This example shows a pillow that straps around the waist. It may be appropriate for those whose sleep study shows milder sleep apnea that occurs primarily when lying flat on one's back. Preliminary studies have shown benefit but effectiveness at home may need to be verified by a home sleep test. These devices are generally not covered by medical insurance.                      Oral Appliance  What is oral appliance therapy?  An oral appliance is a small acrylic device that fits over the upper and lower teeth or tongue (similar to an orthodontic retainer or a mouth  guard). This device slightly advances the lower jaw or tongue, which moves the base of the tongue forward, opens the airway, improves breathing and can effectively treat snoring and obstructive sleep apnea sleep apnea. The appliance is fabricated and customized by a qualified dentist with experience in treating snoring and sleep apnea. Oral appliances are usually well tolerated and have relatively high compliance by patients1, 2, 3.  When is an oral appliance indicated?  Oral appliance therapy is recommended as a first-line treatment for patients with primary snoring, mild sleep apnea, and for patients with moderate sleep apnea who prefer appliance therapy to use of CPAP4, 5. Severity of sleep apnea is determined by sleep testing and is based on the number of respiratory events per hour of sleep.   How successful is oral appliance therapy?  The success rate of oral appliance therapy in patients with mild sleep apnea is 75-80% while in patients with moderate sleep apnea it is 50-70%. The chance of success in patients with severe sleep apnea is 40-50%. The research also shows that oral appliances have a beneficial effect on the cardiovascular health of DIRK patients at the same magnitude as CPAP therapy7.  Oral appliances should be a second-line treatment in cases of severe sleep apnea, but if not completely successful then a combination therapy utilizing CPAP plus oral appliance therapy may be effective. Oral appliances tend to be effective in a broad range of patients although studies show that the patients who have the highest success are females, younger patients, those with milder disease, and less severe obesity. 3, 6.   The chances of success are lower in patients who have more severe DIRK, are older, and those who are morbidly obese.     Example of an oral appliance   Finding a dentist that practices dental sleep medicine  Specific training is available through the American Academy of Dental Sleep Medicine for  dentists interested in working in the field of sleep. To find a dentist who is educated in the field of sleep and the use of oral appliances, near you, visit the Web site of the American Academy of Dental Sleep Medicine; also see   http://www.accpstorage.org/newOrganization/patients/oralAppliances.pdf  To search for a dentist certified in these practices:  Http://aadsm.org/FindADentist.aspx?1  1. Hollie et al. Objectively measured vs self-reported compliance during oral appliance therapy for sleep-disordered breathing. Chest 2013; 144(5): 2017-6210.  2. To, et al. Objective measurement of compliance during oral appliance therapy for sleep-disordered breathing. Thorax 2013; 68(1): 91-96.  3. Bruce et al. Mandibular advancement devices in 620 men and women with DIRK and snoring: tolerability and predictors of treatment success. Chest 2004; 125: 9589-6881.  4. Madeline, et al. Oral appliances for snoring and DIRK: a review. Sleep 2006; 29: 244-262.  5. Juaquin et al. Oral appliance treatment for DIRK: an update. J Clin Sleep Med 2014; 10(2): 215-227.  6. Tyler et al. Predictors of OSAH treatment outcome. J Dent Res 2007; 86: 4679-3522.    Nasal Valves                 Nasal valves may not be effective if you have frequent nasal congestion or have difficulty breathing through your nose. They may be an option for mild apnea if other options are not well tolerated. The efficacy of these devices is generally less than CPAP or oral appliances.      Weight Loss:    Weight management is a personal decision.  If you are interested in exploring weight loss strategies, the following discussion covers the impact on weight loss on sleep apnea and the approaches that may be successful.    Weight loss decreases severity of sleep apnea in most people with obesity. For those with mild obesity who have developed snoring with weight gain, even 15-30 pound weight loss can improve and occasionally eliminate sleep  apnea.  Structured and life-long dietary and health habits are necessary to lose weight and keep healthier weight levels.     Though there may be significant health benefits from weight loss, long-term weight loss is very difficult to achieve- studies show success with dietary management in less than 10% of people. In addition, substantial weight loss may require years of dietary control and may be difficult if patients have severe obesity. In these cases, surgical management may be considered.  Finally, older individuals who have tolerated obesity without health complications may be less likely to benefit from weight loss strategies.    Your BMI is Body mass index is 32.5 kg/(m^2).  Body mass index (BMI) is one way to tell whether you are at a healthy weight, overweight, or obese. It measures your weight in relation to your height.  A BMI of 18.5 to 24.9 is in the healthy range. A person with a BMI of 25 to 29.9 is considered overweight, and someone with a BMI of 30 or greater is considered obese. More than two-thirds of American adults are considered overweight or obese.  Being overweight or obese increases the risk for further weight gain. Excess weight may lead to heart disease and diabetes.  Creating and following plans for healthy eating and physical activity may help you improve your health.  Weight control is part of healthy lifestyle and includes exercise, emotional health, and healthy eating habits. Careful eating habits lifelong are the mainstay of weight control. Though there are significant health benefits from weight loss, long-term weight loss with diet alone may be very difficult to achieve- studies show long-term success with dietary management in less than 10% of people. Attaining a healthy weight may be especially difficult to achieve in those with severe obesity. In some cases, medications, devices and surgical management might be considered.  What can you do?  If you are overweight or obese and  are interested in methods for weight loss, you should discuss this with your provider.     Consider reducing daily calorie intake by 500 calories.     Keep a food journal.     Avoiding skipping meals, consider cutting portions instead.    Diet combined with exercise helps maintain muscle while optimizing fat loss. Strength training is particularly important for building and maintaining muscle mass. Exercise helps reduce stress, increase energy, and improves fitness. Increasing exercise without diet control, however, may not burn enough calories to loose weight.       Start walking three days a week 10-20 minutes at a time    Work towards walking thirty minutes five days a week     Eventually, increase the speed of your walking for 1-2 minutes at time    In addition, we recommend that you review healthy lifestyles and methods for weight loss available through the National Institutes of Health patient information sites:  http://win.niddk.nih.gov/publications/index.htm    And look into health and wellness programs that may be available through your health insurance provider, employer, local community center, or [employer]an club.    Weight management plan: Patient was referred to their PCP to discuss a diet and exercise plan.    Surgery:    Upper Airway Surgery for DIRK  Surgery for DIRK is a second-line treatment option in the management of sleep apnea.  Surgery should be considered for patients who are having a difficult time tolerating CPAP.    Surgery for DIRK is directed at areas that are responsible for narrowing or complete obstruction of the airway during sleep.  There are a wide range of procedures available to enlarge and/or stabilize the airway to prevent blockage of breathing in the three major areas where it can occur: the palate, tongue, and nasal regions.  Successful surgical treatment depends on the accurate identification of the factors responsible for obstructive sleep apnea in each person.  A personalized  approach is required because there is no single treatment that works well for everyone.  Because of anatomic variation, consultation with an examination by a sleep surgeon is a critical first step in determining what surgical options are best for each patient.  In some cases, examination during sedation may be recommended in order to guide the selection of procedures.  Patients will be counseled about risks and benefits as well as the typical recovery course after surgery. Surgery is typically not a cure for a person s DIRK.  However, surgery will often significantly improve one s DIRK severity (termed  success rate ).  Even in the absence of a cure, surgery will decrease the cardiovascular risk associated with OSA7; improve overall quality of life8 (sleepiness, functionality, sleep quality, etc).          Palate Procedures:  Patients with DIRK often have narrowing of their airway in the region of their tonsils and uvula.  The goals of palate procedures are to widen the airway in this region as well as to help the tissues resist collapse.  Modern palate procedure techniques focus on tissue conservation and soft tissue rearrangement, rather than tissue removal.  Often the uvula is preserved in this procedure. Residual sleep apnea is common in patient after pharyngoplasty with an average reduction in sleep apnea events of 33%2.      Tongue Procedures:  While patients are awake, the muscles that surround the throat are active and keep this region open for breathing. These muscles relax during sleep, allowing the tongue and other structures to collapse and block breathing.  There are several different tongue procedures available.  Selection of a tongue base procedure depends on characteristics seen on physical exam.  Generally, procedures are aimed at removing bulky tissues in this area or preventing the back of the tongue from falling back during sleep.  Success rates for tongue surgery range from 50-62%3.    Hypoglossal  Nerve Stimulation:  Hypoglossal nerve stimulation has recently received approval from the [place] Food and Drug Administration for the treatment of obstructive sleep apnea.  This is based on research showing that the system was safe and effective in treating sleep apnea6.  Results showed that the median AHI score decreased 68%, from 29.3 to 9.0. This therapy uses an implant system that senses breathing patterns and delivers mild stimulation to airway muscles, which keeps the airway open during sleep.  The system consists of three fully implanted components: a small generator (similar in size to a pacemaker), a breathing sensor, and a stimulation lead.  Using a small handheld remote, a patient turns the therapy on before bed and off upon awakening.    Candidates for this device must be greater than 22 years of age, have moderate to severe DIRK (AHI between 20-65), BMI less than 32, have tried CPAP/oral appliance without success, and have appropriate upper airway anatomy (determined by a sleep endoscopy performed by Dr. Hopper).    Hypoglossal Nerve Stimulation Pathway:    The sleep surgeon s office will work with the patient through the insurance prior-authorization process (including communications and appeals).    Nasal Procedures:  Nasal obstruction can interfere with nasal breathing during the day and night.  Studies have shown that relief of nasal obstruction can improve the ability of some patients to tolerate positive airway pressure therapy for obstructive sleep apnea1.  Treatment options include medications such as nasal saline, topical corticosteroid and antihistamine sprays, and oral medications such as antihistamines or decongestants. Non-surgical treatments can include external nasal dilators for selected patients. If these are not successful by themselves, surgery can improve the nasal airway either alone or in combination with these other options.      Combination Procedures:  Combination of  surgical procedures and other treatments may be recommended, particularly if patients have more than one area of narrowing or persistent positional disease.  The success rate of combination surgery ranges from 66-80%2,3.      1. Mike MARIE. The Role of the Nose in Snoring and Obstructive Sleep Apnoea: An Update.  Eur Arch Otorhinolaryngol. 2011; 268: 1365-73.  2.  Antoinette SM; Chuckie JA; Lolita JR; Pallanch JF; Trista MB; Carolina SG; Epi PEÑA. Surgical modifications of the upper airway for obstructive sleep apnea in adults: a systematic review and meta-analysis. SLEEP 2010;33(10):6250-2012. Danny PONCE. Hypopharyngeal surgery in obstructive sleep apnea: an evidence-based medicine review.  Arch Otolaryngol Head Neck Surg. 2006 Feb;132(2):206-13.  3. Jeff YH1, Bailee Y, Blair SEAN. The efficacy of anatomically based multilevel surgery for obstructive sleep apnea. Otolaryngol Head Neck Surg. 2003 Oct;129(4):327-35.  4. Danny PONCE, Goldberg A. Hypopharyngeal Surgery in Obstructive Sleep Apnea: An Evidence-Based Medicine Review. Arch Otolaryngol Head Neck Surg. 2006 Feb;132(2):206-13.  5. Nikita PUENTE et al. Upper-Airway Stimulation for Obstructive Sleep Apnea.  N Engl J Med. 2014 Jan 9;370(2):139-49.  6. Sonya Y et al. Increased Incidence of Cardiovascular Disease in Middle-aged Men with Obstructive Sleep Apnea. Am J Respir Crit Care Med; 2002 166: 159-165  7. Ramos EM et al. Studying Life Effects and Effectiveness of Palatopharyngoplasty (SLEEP) study: Subjective Outcomes of Isolated Uvulopalatopharyngoplasty. Otolaryngol Head Neck Surg. 2011; 144: 623-631.  8.   Your blood pressure was checked while you were in clinic today.  Please read the guidelines below about what these numbers mean and what you should do about them.  Your systolic blood pressure is the top number.  This is the pressure when the heart is pumping.  Your diastolic blood pressure is the bottom number.  This is the pressure in between beats.  If your  systolic blood pressure is less than 120 and your diastolic blood pressure is less than 80, then your blood pressure is normal. There is nothing more that you need to do about it  If your systolic blood pressure is 120-139 or your diastolic blood pressure is 80-89, your blood pressure may be higher than it should be.  You should have your blood pressure re-checked within a year by a primary care provider.  If your systolic blood pressure is 140 or greater or your diastolic blood pressure is 90 or greater, you may have high blood pressure.  High blood pressure is treatable, but if left untreated over time it can put you at risk for heart attack, stroke, or kidney failure.  You should have your blood pressure re-checked by a primary care provider within the next four weeks.                Follow-ups after your visit        Your next 10 appointments already scheduled     Apr 19, 2018  2:45 PM CDT   (Arrive by 2:30 PM)   Return Visit with Lilly Tijerina MD   [facility] ([facility])    [address] [place] [id number] [phone]              Future tests that were ordered for you today     Open Future Orders        Priority Expected Expires Ordered    HST-Home Sleep Apnea Test Routine  9/19/2018 3/20/2018            Who to contact     If you have questions or need follow up information about today's clinic visit or your schedule please contact [facility] directly at [phone].  Normal or non-critical lab and imaging results will be communicated to you by MyChart, letter or phone within 4 business days after the clinic has received the results. If you do not hear from us within 7 days, please contact the clinic through MyChart or phone. If you have a critical or abnormal lab result, we will notify you by phone as soon as possible.  Submit refill requests through [employer] or call your pharmacy and they will forward the  "refill request to us. Please allow 3 business days for your refill to be completed.          Additional Information About Your Visit        [employer] Information     [employer] gives you secure access to your electronic health record. If you see a primary care provider, you can also send messages to your care team and make appointments. If you have questions, please call your primary care clinic.  If you do not have a primary care provider, please call [phone] and they will assist you.        Care EveryWhere ID     This is your Care EveryWhere ID. This could be used by other organizations to access your [facility] medical records  [id number]        Your Vitals Were     Pulse Respirations Height Pulse Oximetry BMI (Body Mass Index)       67 12 1.815 m (5' 11.46\") 98% 32.5 kg/m2        Blood Pressure from Last 3 Encounters:   03/20/18 117/76   03/09/18 (!) 137/91   02/09/18 146/82    Weight from Last 3 Encounters:   03/20/18 107 kg (236 lb)   03/09/18 107.2 kg (236 lb 4.8 oz)   02/09/18 106.1 kg (234 lb)              We Performed the Following     SLEEP EVALUATION & MANAGEMENT REFERRAL - ADULT -[facility]  [phone] (Age 18 and up)        Primary Care Provider Office Phone # Fax #    Kenneth G Pallas, [phone]       [facility] [address] [facility] [address]        Equal Access to Services     MILAN OLSEN AH: Hadii kaden mcmahono Wilfred, waaxda lumargaretadaha, qaybta kaalmada dilciayada, iva walker. So [facility] [phone].    ATENCIÓN: Si habla español, tiene a sherman disposición servicios gratuitos de asistencia lingüística. Llame al [phone].    We comply with applicable federal civil rights laws and [place] laws. We do not discriminate on the basis of race, color, national origin, age, disability, sex, sexual orientation, or gender identity.            Thank you!     Thank you for choosing [facility]  for your care. Our goal is always to provide you with excellent care. Hearing back from our patients is one way we can continue to improve our services. Please take a few minutes to complete the written survey that you may receive in the mail after your visit with us. Thank you!             Your Updated Medication List - Protect others around you: Learn how to safely use, store and throw away your medicines at www.disposemymeds.org.          This list is accurate as of 3/20/18 10:53 AM.  Always use your most recent med list.                   Brand Name Dispense Instructions for use Diagnosis    ibuprofen 200 MG tablet    ADVIL/MOTRIN     Take 600 mg by mouth every 4 hours as needed for mild pain or moderate pain        SIMVASTATIN PO      Take 20 mg by mouth At Bedtime        TYLENOL PO      Take 1,000 mg by mouth every 8 hours as needed for mild pain or fever        WELLBUTRIN PO      Take 150 mg by mouth 2 times daily

## 2018-04-19 ENCOUNTER — OFFICE VISIT (OUTPATIENT)
Dept: NEUROSURGERY | Facility: CLINIC | Age: 58
End: 2018-04-19
Payer: COMMERCIAL

## 2018-04-19 VITALS
HEIGHT: 71 IN | HEART RATE: 66 BPM | BODY MASS INDEX: 33.12 KG/M2 | SYSTOLIC BLOOD PRESSURE: 121 MMHG | WEIGHT: 236.6 LBS | DIASTOLIC BLOOD PRESSURE: 63 MMHG

## 2018-04-19 DIAGNOSIS — M51.16 LUMBAR DISC HERNIATION WITH RADICULOPATHY: Primary | ICD-10-CM

## 2018-04-19 ASSESSMENT — PAIN SCALES - GENERAL: PAINLEVEL: MILD PAIN (3)

## 2018-04-19 NOTE — LETTER
4/19/2018       RE: Bud Chiang  [address]  [place] [address]     Dear Colleague,    Thank you for referring your patient, Bud Chiang, to the [facility]OS[facility]ERY at [facility]. Please see a copy of my visit note below.    It was a pleasure to evaluate Mr. Bud Chiang today in scheduled 3-month postoperative follow-up in my clinic today.    Mr. Chiang is a 58 year old male  emergently referred to me by Dr. Catarino Ponce with severe spinal stenosis at L4-5 due to very large disc herniation with cephalad migration. The disc herniation was occupying over 80% of canal volume and causing complete effacement of CSF with severe stenosis. Mr. Chiang had severe low back pain and severe bilateral radicular leg pain, as well as difficulty initiating urinary stream. Therefore he was taken to surgery urgently within 24 hours of my initial consultation with him in my clinic.  Because the disc herniation had a large central component, I performed a midline open approach at L4-5 to enable bilateral microdiskectomy to ensure complete removal and decompression.     He underwent surgery on 1/26/18 for bilateral L4-5 hemilaminectomy and bilateral microdiskectomy.    Mr. Chiang today states that he has had complete resolution of his pre-operative symptoms.  He has normal bladder function and no leg radiating pain. His leg numbness and weakness are significantly improved compared to prior to surgery.    He states that he is very pleased with his surgical results.    On physical examination, his strength is 5/5 throughout bilateral hip flexion, knee extension, dorsiflexion, extensor hallucis longus, plantar flexion; he has overall improvement in sensation- his previously numb right foot now has sensation, he has some dysesthesias in the right heel as the sensation has returned which are minor; incision is well-healed.    He can resume normal activities at this time with  recommendations for no lifting greater than 50 pounds life-long.  He can return to see us on an as-needed basis if any new symptoms arise.    It was a pleasure to be involved in the care of this gentleman.      Again, thank you for allowing me to participate in the care of your patient.      Sincerely,    Llily Tijerina MD

## 2018-04-19 NOTE — MR AVS SNAPSHOT
After Visit Summary   4/19/2018    Bud Chiang    MRN: [id number]           Patient Information     Date Of Birth          1960        Visit Information        Provider Department      4/19/2018 2:45 PM Lilly Tijerina MD [facility]        Today's Diagnoses     Lumbar disc herniation with radiculopathy    -  1       Follow-ups after your visit        Your next 10 appointments already scheduled     Apr 24, 2018  3:30 PM CDT   HST  with  SLEEP LAB   [facility]    [address] [place] [id number]   [phone]            Apr 25, 2018  8:30 AM CDT   HST Drop Off with  SLEEP DME   [facility] ([facility])    [address] [place] [address]   [phone]            May 02, 2018  4:00 PM CDT   Return Sleep Patient with Vishnu Tovar MD   [facility] ([facility])    [address] [place] [address]   [phone]              Who to contact     Please call your clinic at [phone] to:    Ask questions about your health    Make or cancel appointments    Discuss your medicines    Learn about your test results    Speak to your doctor            Additional Information About Your Visit        MyChart Information     [employer] gives you secure access to your electronic health record. If you see a primary care provider, you can also send messages to your care team and make appointments. If you have questions, please call your primary care clinic.  If you do not have a primary care provider, please call [phone] and they will assist you.      [employer] is an electronic gateway that provides easy, online access to your medical records. With [employer], you can request a clinic appointment, read your test results, renew a prescription or  "communicate with your care team.     To access your existing account, please contact your [facility] or call [phone] for assistance.        Care EveryWhere ID     This is your Care EveryWhere ID. This could be used by other organizations to access your [facility] medical records  [id number]        Your Vitals Were     Pulse Height BMI (Body Mass Index)             66 1.803 m (5' 11\") 33 kg/m2          Blood Pressure from Last 3 Encounters:   04/19/18 121/63   03/20/18 117/76   03/09/18 (!) 137/91    Weight from Last 3 Encounters:   04/19/18 107.3 kg (236 lb 9.6 oz)   03/20/18 107 kg (236 lb)   03/09/18 107.2 kg (236 lb 4.8 oz)              Today, you had the following     No orders found for display       Primary Care Provider Office Phone # Fax #    Kenneth G Pallas, [phone]       [facility] [address] [facility] [address]        Equal Access to Services     [facility]: Hadii aad ku hadasho Soomaali, waaxda luqadaha, qaybta kaalmada adeegyada, waxyinka ogden hayemma walker . So [facility] [phone].    ATENCIÓN: Si habla español, tiene a sherman disposición servicios gratuitos de asistencia lingüística. Llame al [phone].    We comply with applicable federal civil rights laws and [place] laws. We do not discriminate on the basis of race, color, national origin, age, disability, sex, sexual orientation, or gender identity.            Thank you!     Thank you for choosing [facility]  for your care. Our goal is always to provide you with excellent care. Hearing back from our patients is one way we can continue to improve our services. Please take a few minutes to complete the written survey that you may receive in the mail after your visit with us. Thank you!             Your Updated Medication List - Protect others around you: Learn how to safely use, store and throw away your medicines at " www.disposemymeds.org.          This list is accurate as of 4/19/18  2:49 PM.  Always use your most recent med list.                   Brand Name Dispense Instructions for use Diagnosis    ibuprofen 200 MG tablet    ADVIL/MOTRIN     Take 600 mg by mouth every 4 hours as needed for mild pain or moderate pain        SIMVASTATIN PO      Take 20 mg by mouth At Bedtime        TYLENOL PO      Take 1,000 mg by mouth every 8 hours as needed for mild pain or fever        WELLBUTRIN PO      Take 150 mg by mouth 2 times daily

## 2018-04-19 NOTE — PROGRESS NOTES
It was a pleasure to evaluate Mr. Bud Chiang today in scheduled 3-month postoperative follow-up in my clinic today.    Mr. Chiang is a 58 year old male emergently referred to me by Dr. Catarino Ponce with severe spinal stenosis at L4-5 due to very large disc herniation with cephalad migration. The disc herniation was occupying over 80% of canal volume and causing complete effacement of CSF with severe stenosis. Mr. Chiang had severe low back pain and severe bilateral radicular leg pain, as well as difficulty initiating urinary stream. Therefore he was taken to surgery urgently within 24 hours of my initial consultation with him in my clinic.  Because the disc herniation had a large central component, I performed a midline open approach at L4-5 to enable bilateral microdiskectomy to ensure complete removal and decompression.     He underwent surgery on 1/26/18 for bilateral L4-5 hemilaminectomy and bilateral microdiskectomy.    Mr. Chiang today states that he has had complete resolution of his pre-operative symptoms.  He has normal bladder function and no leg radiating pain. His leg numbness and weakness are significantly improved compared to prior to surgery.    He states that he is very pleased with his surgical results.    On physical examination, his strength is 5/5 throughout bilateral hip flexion, knee extension, dorsiflexion, extensor hallucis longus, plantar flexion; he has overall improvement in sensation- his previously numb right foot now has sensation, he has some dysesthesias in the right heel as the sensation has returned which are minor; incision is well-healed.    He can resume normal activities at this time with recommendations for no lifting greater than 50 pounds life-long.  He can return to see us on an as-needed basis if any new symptoms arise.    It was a pleasure to be involved in the care of this gentleman.    Lilly Tijerina MD    [facility] Department of  Neurosurgery  Office: [phone]    4/19/2018

## 2018-04-24 ENCOUNTER — OFFICE VISIT (OUTPATIENT)
Dept: SLEEP MEDICINE | Facility: CLINIC | Age: 58
End: 2018-04-24
Payer: COMMERCIAL

## 2018-04-24 DIAGNOSIS — R06.83 SNORING: ICD-10-CM

## 2018-04-24 DIAGNOSIS — G47.9 SLEEP DISTURBANCE: ICD-10-CM

## 2018-04-24 PROCEDURE — G0399 HOME SLEEP TEST/TYPE 3 PORTA: HCPCS | Performed by: INTERNAL MEDICINE

## 2018-04-24 NOTE — MR AVS SNAPSHOT
After Visit Summary   4/24/2018    Bud Chiang    MRN: [id number]           Patient Information     Date Of Birth          1960        Visit Information        Provider Department      4/24/2018 3:30 PM [facility]        Today's Diagnoses     Snoring        Sleep disturbance           Follow-ups after your visit        Your next 10 appointments already scheduled     Apr 25, 2018  8:30 AM CDT   HST Drop Off with [facility] DME   [facility] ([facility])    [address]  [place] [id number] [phone]            May 02, 2018  4:00 PM CDT   Return Sleep Patient with Vishnu Tovar MD   [facility] ([facility])    [address]  [place] [id number] [phone]              Who to contact     If you have questions or need follow up information about today's clinic visit or your schedule please contact [facility] directly at [phone].  Normal or non-critical lab and imaging results will be communicated to you by [employer], letter or phone within 4 business days after the clinic has received the results. If you do not hear from us within 7 days, please contact the clinic through [email or web address] or phone. If you have a critical or abnormal lab result, we will notify you by phone as soon as possible.  Submit refill requests through [employer] or call your pharmacy and they will forward the refill request to us. Please allow 3 business days for your refill to be completed.          Additional Information About Your Visit        [employer] Information     [employer] gives you secure access to your electronic health record. If you see a primary care provider, you can also send messages to your care team and make appointments. If you have questions, please call your primary care clinic.  If you do not have a  primary care provider, please call [phone] and they will assist you.        Care EveryWhere ID     This is your Care EveryWhere ID. This could be used by other organizations to access your [facility] medical records  [id number]         Blood Pressure from Last 3 Encounters:   04/19/18 121/63   03/20/18 117/76   03/09/18 (!) 137/91    Weight from Last 3 Encounters:   04/19/18 107.3 kg (236 lb 9.6 oz)   03/20/18 107 kg (236 lb)   03/09/18 107.2 kg (236 lb 4.8 oz)              We Performed the Following     HST-Home Sleep Apnea Test        Primary Care Provider Office Phone # Fax #    Kenneth G Pallas, [phone]       [facility] [address] [facility] [address]        Equal Access to Services     [facility] : Hadii aad ku hadasho Soomaali, waaxda luqadaha, qaybta kaalmada adeegyada, waxay santoshin hayaan dilcia walker . So [facility] [phone].    ATENCIÓN: Si habla español, tiene a sherman disposición servicios gratuitos de asistencia lingüística. Rex al [phone].    We comply with applicable federal civil rights laws and [place] laws. We do not discriminate on the basis of race, color, national origin, age, disability, sex, sexual orientation, or gender identity.            Thank you!     Thank you for choosing [facility]  for your care. Our goal is always to provide you with excellent care. Hearing back from our patients is one way we can continue to improve our services. Please take a few minutes to complete the written survey that you may receive in the mail after your visit with us. Thank you!             Your Updated Medication List - Protect others around you: Learn how to safely use, store and throw away your medicines at www.disposemymeds.org.          This list is accurate as of 4/24/18  6:11 PM.  Always use your most recent med list.                   Brand Name Dispense Instructions for use Diagnosis    ibuprofen 200 MG  tablet    ADVIL/MOTRIN     Take 600 mg by mouth every 4 hours as needed for mild pain or moderate pain        SIMVASTATIN PO      Take 20 mg by mouth At Bedtime        TYLENOL PO      Take 1,000 mg by mouth every 8 hours as needed for mild pain or fever        WELLBUTRIN PO      Take 150 mg by mouth 2 times daily

## 2018-04-24 NOTE — PROGRESS NOTES
Patient picked up Hst 4/24/18.  Demonstrated back for knowledge of use.  Will return Hst 4/25/18.      Bernadette Alanis LPN/CECY

## 2018-04-25 ENCOUNTER — DOCUMENTATION ONLY (OUTPATIENT)
Dept: SLEEP MEDICINE | Facility: CLINIC | Age: 58
End: 2018-04-25
Payer: COMMERCIAL

## 2018-04-25 NOTE — PROGRESS NOTES
This HST performed using a Noxturnal T3 device which recorded snore, sound, movement activity, body position, nasal pressure, oronasal thermal airflow, pulse, oximetry and both chest and abdominal respiratory effort. HST data was confined to the time patients states they were in bed.   Patient was score using 4% rules. Patient respiratory events showed an AHI 5.3 with loud snoring. Patient SP02 below 89% was 0.0 minutes. Overall signal quality was good.    Pt will follow up with sleep provider to determine appropriate therapy.     Ordering La COFFMAN Abdullahi, MD

## 2018-04-26 NOTE — PROCEDURES
"  [facility] Study Report  ===========================    Patient Information:  --------------------  Bud Chiang  Patient ID:  [id number]   :  1960  Recording date:  2018       Indication of the sleep study: Bud Chiang is a 58 year old male with history of depression, hyperlipidemia, lumbar stenosis and herniated disc s/p surgery who was seen at the [facility] in [place] with complains of loud snoring (worse when he sleeps in his back), witnessed apnea, waking up gasping air and choking, restless sleep, excessive daytime sleepiness and tiredness, morning headache and dry mouth and throat for 15 years. Ht 1.815 m (5' 11.46\")  Wt 107 kg (236 lb)  SpO2 98%  BMI 32.5 kg/m2.      Recording Information:  ----------------------  This was a Type 3 unattended sleep study (measuring flow, effort, heart rate and pulse oximetry) performed at home. Please refer to EPIC procedure for detailed scoring report.   This study was considered adequate based on > 4 hours of quality oximetry and respiratory recording. As specified by the AASM Manual for the Scoring of Sleep and Associated events, version 2.3, Rule VIII.D 1B, 4% oxygen desaturation scoring for hypopneas is used as a standard of care on all home sleep apnea testing.  The severity of sleep disordered breathing can be underestimated during portable testing because the apnea/hypopnea index is calculated using total recording time rather than total sleep time.  Recording date:  2018   Recording duration:  560.5 minutes  Time in bed:  307.0 minutes  Estimated sleep efficiency was 98.1 %.   Time spent in supine position:  6.6 % of total bed time  The test quality was: adequate for interpretation  The test duration was: adequate for interpretation  Respiratory Analysis:  ---------------------  AHI: 5.3 /hour  AHI (supine):  23.8 /hour  AHI (non-supine):  4.0 /hour  RHONDA: 4.3 /hour (Number of oxygen desaturations per " hour)  Snore index: 23.4 (percentage of time spent snoring versus the total time spent in bed)  Central apnea index:  1.2 / hour    The sleep study demonstrated mild sleep disordered breathing which was characterized predominantly by obstructive apneas and hypopneas. The sleep-disordered breathing was predominantly in supine body position.     Oximetry Analysis:  ------------------  Baseline oxygen saturation during sleep was normal.   Lowest oxygen saturation:  74.0 %  Majority of the sleep time spent with oxygen saturation greater than 90%.   0.1% of the total recording time was spent with oxygen saturation less than 90%.   Time Spent oxygen saturation below 88% was 0 minutes.    Cardiac Analysis:  -----------------  Maximum pulse rate was 92.0 /minute and minimal pulse rate was 55.0/minute. Time spent above 100 bpm was 0.0 minutes and time spent below 40 bpm was 0.0 minutes.    Diagnosis:  ==========  Mild obstructive sleep apnea G47.33    Recommendations:   ================    1. Based on the presence of the mild obstructive sleep apnea and snoring, patient may be a candidate for dental appliance through referral to Sleep Dentistry for the treatment of obstructive sleep apnea and/or socially disruptive snoring.    2. If oral devices are not acceptable or effective, patient may benefit from evaluation of possible surgical options.  If he/she is interested, would recommend referral to specialized ENT-Sleep provider.  3. Recommend optimizing regular wake-sleep schedule and avoiding sleep deprivation.    Other Recommendations:  ======================  1. Start weight loss program if BMI > 25.    2. Avoid sedating medications, including narcotics and alcohol, as these may exacerbate sleep apnea and/or if underlying respiratory disorders.     3. Positional therapy by avoiding sleep in supine position.    4. Avoid driving when drowsy    5. Follow up primary care doctor and/or referring physician as  scheduled.      Electronically signed by:      Vishnu Tovar MD  Sleep Medicine Physician  [facility], Sleep Medicine and Internal Medicine  [facility].

## 2018-05-02 ENCOUNTER — OFFICE VISIT (OUTPATIENT)
Dept: SLEEP MEDICINE | Facility: CLINIC | Age: 58
End: 2018-05-02
Payer: COMMERCIAL

## 2018-05-02 VITALS
DIASTOLIC BLOOD PRESSURE: 78 MMHG | OXYGEN SATURATION: 95 % | WEIGHT: 236 LBS | SYSTOLIC BLOOD PRESSURE: 118 MMHG | HEART RATE: 68 BPM | RESPIRATION RATE: 12 BRPM | BODY MASS INDEX: 33.04 KG/M2 | HEIGHT: 71 IN

## 2018-05-02 DIAGNOSIS — G47.33 OSA (OBSTRUCTIVE SLEEP APNEA): Primary | ICD-10-CM

## 2018-05-02 PROCEDURE — 99213 OFFICE O/P EST LOW 20 MIN: CPT | Performed by: INTERNAL MEDICINE

## 2018-05-02 NOTE — NURSING NOTE
"Chief Complaint   Patient presents with     RECHECK     f/u hst 4/24       Initial /78  Pulse 68  Resp 12  Ht 1.815 m (5' 11.46\")  Wt 107 kg (236 lb)  SpO2 95%  BMI 32.5 kg/m2 Estimated body mass index is 32.5 kg/(m^2) as calculated from the following:    Height as of this encounter: 1.815 m (5' 11.46\").    Weight as of this encounter: 107 kg (236 lb).  Medication Reconciliation: davina Santiago  "

## 2018-05-02 NOTE — PATIENT INSTRUCTIONS
"MY TREATMENT INFORMATION FOR SLEEP APNEA-  Bud Chiang    MY CONTACT NUMBERS ARE:  [phone]  DOCTOR : Vishnu SHE[facility]  SLEEP CENTER :  [place]    You have mild sleep apnea/snoring: oral appliance is recommended for you.    Patient was advised not to drive if drowsy or sleepy.    Frequently asked questions:  1. What is Obstructive Sleep Apnea (DIRK)? DIRK is the most common type of sleep apnea. Apnea literally means, \"without breath.\" It is characterized by repetitive pauses in breathing, despite continued effort to breathe, and is usually associated with a reduction in blood oxygen saturation. Apneas can last 10 to over 60 seconds. It is caused by narrowing or collapse of the upper airway as muscles relax during sleep. Severity of sleep apnea is determined by frequency of breathing events and their effect on your sleep and oxygen levels determined during sleep testing.   2. What are the consequences of DIRK? Symptoms include: daytime sleepiness- possibly increasing the risk of falling asleep while driving, unrefreshing/restless sleep, snoring, insomnia, waking frequently to urinate, waking with heartburn or reflux, reduced concentration and memory, and morning headaches. Other health consequences may include development of high blood pressure and other cardiovascular disease in persons who are susceptible. Untreated DIRK  can contribute to heart disease, stroke and diabetes.   3. What are the treatment options? In most situations, sleep apnea is a lifelong disease that must be managed with daily therapy. Medications are not effective for sleep apnea and surgery is generally not performed until other therapies have been tried. Therapy is usually tailored to the individual patient based on many factors including your wishes as well as severity of sleep apnea and severity of obesity. Continuous Positive Airway (CPAP) is the most reliable treatment. An oral device to hold your jaw forward is usually    Oral " Appliance  What is oral appliance therapy?  An oral appliance is a small acrylic device that fits over the upper and lower teeth or tongue (similar to an orthodontic retainer or a mouth guard). This device slightly advances the lower jaw or tongue, which moves the base of the tongue forward, opens the airway, improves breathing and can effectively treat snoring and obstructive sleep apnea sleep apnea. The appliance is fabricated and customized by a qualified dentist with experience in treating snoring and sleep apnea. Oral appliances are usually well tolerated and have relatively high compliance by patients1, 2, 3.  When is an oral appliance indicated?  Oral appliance therapy is recommended as a first-line treatment for patients with primary snoring, mild sleep apnea, and for patients with moderate sleep apnea who prefer appliance therapy to use of CPAP4, 5. Severity of sleep apnea is determined by sleep testing and is based on the number of respiratory events per hour of sleep.   How successful is oral appliance therapy?  The success rate of oral appliance therapy in patients with mild sleep apnea is 75-80% while in patients with moderate sleep apnea it is 50-70%. The chance of success in patients with severe sleep apnea is 40-50%. The research also shows that oral appliances have a beneficial effect on the cardiovascular health of DIRK patients at the same magnitude as CPAP therapy7.  Oral appliances should be a second-line treatment in cases of severe sleep apnea, but if not completely successful then a combination therapy utilizing CPAP plus oral appliance therapy may be effective. Oral appliances tend to be effective in a broad range of patients although studies show that the patients who have the highest success are females, younger patients, those with milder disease, and less severe obesity. 3, 6.   The chances of success are lower in patients who have more severe DIRK, are older, and those who are morbidly  obese.     Example of an oral appliance   Finding a dentist that practices dental sleep medicine  Specific training is available through the American Academy of Dental Sleep Medicine for dentists interested in working in the field of sleep. To find a dentist who is educated in the field of sleep and the use of oral appliances, near you, visit the Web site of the American Academy of Dental Sleep Medicine; also see   http://www.accpstorage.org/newOrganization/patients/oralAppliances.pdf  To search for a dentist certified in these practices:  Http://aadsm.org/FindADentist.[email or web address]?1  1. Hollie et al. Objectively measured vs self-reported compliance during oral appliance therapy for sleep-disordered breathing. Chest 2013; 144(5): 4568-7221.  2. To et al. Objective measurement of compliance during oral appliance therapy for sleep-disordered breathing. Thorax 2013; 68(1): 91-96.  3. Bruce, et al. Mandibular advancement devices in 620 men and women with DIRK and snoring: tolerability and predictors of treatment success. Chest 2004; 125: 2131-6229.  4. Madeline, et al. Oral appliances for snoring and DIRK: a review. Sleep 2006; 29: 244-262.  5. Juaquin et al. Oral appliance treatment for DIRK: an update. J Clin Sleep Med 2014; 10(2): 215-227.  6. Tyler et al. Predictors of OSAH treatment outcome. J Dent Res 2007; 86: 1187-0505.    Oral Appliance  These are examples of two of many custom-made devices that are more likely to work in mild sleep apnea                                                      Oral appliances are dental mouth pieces that fit very much like a sports mouth guards or removable orthodontic retainers. They are used to treat snoring  And obstructive sleep apnea . The device prevents the airway from collapsing by either holding the tongue or supporting the jaw in a forward position. Since oral appliances are non-invasive and easy to use, they may be considered as an early treatment option.  Oral appliance therapy (OAT) involves the customization, selection, fabrication, fitting, adjustments and long-term follow-up care of specially designed oral devices, worn during sleep, which reposition the lower jaw and tongue base forward to maintain an open airway.  Custom made oral appliances are proven to be more effective than over-the-counter devices. Therefore, the over-the-counter devices are recommended not to be used as a screening tool nor as a therapeutic option.     Who gets a dental device?  Oral appliance therapy can be used as an alternative to  CPAP therapy for the treatment of mild to moderate sleep apnea and for those patients who prefer OAT to CPAP. Oral appliance therapy is a first line therapy for the treatment of primary snoring. Additionally, OAT is an option for those that cannot tolerate CPAP as therapy or who have experienced insufficient surgical results.                 Possible side effects?  Frequent but minor side effects include: excessive salivation, dry mouth, discomfort of teeth and jaw and temporary changes in the patient s bite.  Potential complications include: jaw pain, permanent occlusal changes and TMJ symptoms.  The above mentioned side effects and complications can be recognized and managed by dentists trained in dental sleep medicine.   Finding a dentist that practices dental sleep medicine  Specific training is available through the American Academy of Dental Sleep Medicine for dentists interested in working in the field of sleep. To find a dentist who is educated in the field of sleep and the use of oral appliances, near you, visit the Web site of the American Academy of Dental Sleep Medicine; also see http://www.accpstorage.org/newOrganization/patients/oralAppliances.pdf   To search for a dentist certified in these practices:  Http://aadsm.org/FindADentist.[email or web address]  Http://www.accpstorage.org/newOrganization/patients/oralAppliances.pdf        Your blood pressure was  checked while you were in clinic today.  Please read the guidelines below about what these numbers mean and what you should do about them.  Your systolic blood pressure is the top number.  This is the pressure when the heart is pumping.  Your diastolic blood pressure is the bottom number.  This is the pressure in between beats.  If your systolic blood pressure is less than 120 and your diastolic blood pressure is less than 80, then your blood pressure is normal. There is nothing more that you need to do about it  If your systolic blood pressure is 120-139 or your diastolic blood pressure is 80-89, your blood pressure may be higher than it should be.  You should have your blood pressure re-checked within a year by a primary care provider.  If your systolic blood pressure is 140 or greater or your diastolic blood pressure is 90 or greater, you may have high blood pressure.  High blood pressure is treatable, but if left untreated over time it can put you at risk for heart attack, stroke, or kidney failure.  You should have your blood pressure re-checked by a primary care provider within the next four weeks.  Your BMI is Body mass index is 32.5 kg/(m^2).  Weight management is a personal decision.  If you are interested in exploring weight loss strategies, the following discussion covers the approaches that may be successful. Body mass index (BMI) is one way to tell whether you are at a healthy weight, overweight, or obese. It measures your weight in relation to your height.  A BMI of 18.5 to 24.9 is in the healthy range. A person with a BMI of 25 to 29.9 is considered overweight, and someone with a BMI of 30 or greater is considered obese. More than two-thirds of [place] adults are considered overweight or obese.  Being overweight or obese increases the risk for further weight gain. Excess weight may lead to heart disease and diabetes.  Creating and following plans for healthy eating and physical activity may help you  improve your health.  Weight control is part of healthy lifestyle and includes exercise, emotional health, and healthy eating habits. Careful eating habits lifelong are the mainstay of weight control. Though there are significant health benefits from weight loss, long-term weight loss with diet alone may be very difficult to achieve- studies show long-term success with dietary management in less than 10% of people. Attaining a healthy weight may be especially difficult to achieve in those with severe obesity. In some cases, medications, devices and surgical management might be considered.  What can you do?  If you are overweight or obese and are interested in methods for weight loss, you should discuss this with your provider.     Consider reducing daily calorie intake by 500 calories.     Keep a food journal.     Avoiding skipping meals, consider cutting portions instead.    Diet combined with exercise helps maintain muscle while optimizing fat loss. Strength training is particularly important for building and maintaining muscle mass. Exercise helps reduce stress, increase energy, and improves fitness. Increasing exercise without diet control, however, may not burn enough calories to loose weight.       Start walking three days a week 10-20 minutes at a time    Work towards walking thirty minutes five days a week     Eventually, increase the speed of your walking for 1-2 minutes at time    In addition, we recommend that you review healthy lifestyles and methods for weight loss available through the National [employer] of [employer] patient information sites:  http://win.niddk.nih.gov/publications/index.htm    And look into health and wellness programs that may be available through your health insurance provider, employer, local community center, or [employer].

## 2018-05-02 NOTE — MR AVS SNAPSHOT
"              After Visit Summary   5/2/2018    Bud Chiang    MRN: [id number]           Patient Information     Date Of Birth          1960        Visit Information        Provider Department      5/2/2018 4:00 PM Vishnu Tovar MD [facility] [place]        Today's Diagnoses     DIRK (obstructive sleep apnea)    -  1      Care Instructions    MY TREATMENT INFORMATION FOR SLEEP APNEA-  Bud Chiang    MY CONTACT NUMBERS ARE:  [phone]  DOCTOR : Vishnu Tovar  SLEEP CENTER :  [place]    You have mild sleep apnea/snoring: oral appliance is recommended for you.    Patient was advised not to drive if drowsy or sleepy.    Frequently asked questions:  1. What is Obstructive Sleep Apnea (DIRK)? DIRK is the most common type of sleep apnea. Apnea literally means, \"without breath.\" It is characterized by repetitive pauses in breathing, despite continued effort to breathe, and is usually associated with a reduction in blood oxygen saturation. Apneas can last 10 to over 60 seconds. It is caused by narrowing or collapse of the upper airway as muscles relax during sleep. Severity of sleep apnea is determined by frequency of breathing events and their effect on your sleep and oxygen levels determined during sleep testing.   2. What are the consequences of DIRK? Symptoms include: daytime sleepiness- possibly increasing the risk of falling asleep while driving, unrefreshing/restless sleep, snoring, insomnia, waking frequently to urinate, waking with heartburn or reflux, reduced concentration and memory, and morning headaches. Other health consequences may include development of high blood pressure and other cardiovascular disease in persons who are susceptible. Untreated DIRK  can contribute to heart disease, stroke and diabetes.   3. What are the treatment options? In most situations, sleep apnea is a lifelong disease that must be managed with daily therapy. Medications are not effective " for sleep apnea and surgery is generally not performed until other therapies have been tried. Therapy is usually tailored to the individual patient based on many factors including your wishes as well as severity of sleep apnea and severity of obesity. Continuous Positive Airway (CPAP) is the most reliable treatment. An oral device to hold your jaw forward is usually    Oral Appliance  What is oral appliance therapy?  An oral appliance is a small acrylic device that fits over the upper and lower teeth or tongue (similar to an orthodontic retainer or a mouth guard). This device slightly advances the lower jaw or tongue, which moves the base of the tongue forward, opens the airway, improves breathing and can effectively treat snoring and obstructive sleep apnea sleep apnea. The appliance is fabricated and customized by a qualified dentist with experience in treating snoring and sleep apnea. Oral appliances are usually well tolerated and have relatively high compliance by patients1, 2, 3.  When is an oral appliance indicated?  Oral appliance therapy is recommended as a first-line treatment for patients with primary snoring, mild sleep apnea, and for patients with moderate sleep apnea who prefer appliance therapy to use of CPAP4, 5. Severity of sleep apnea is determined by sleep testing and is based on the number of respiratory events per hour of sleep.   How successful is oral appliance therapy?  The success rate of oral appliance therapy in patients with mild sleep apnea is 75-80% while in patients with moderate sleep apnea it is 50-70%. The chance of success in patients with severe sleep apnea is 40-50%. The research also shows that oral appliances have a beneficial effect on the cardiovascular health of DIRK patients at the same magnitude as CPAP therapy7.  Oral appliances should be a second-line treatment in cases of severe sleep apnea, but if not completely successful then a combination therapy utilizing CPAP plus  oral appliance therapy may be effective. Oral appliances tend to be effective in a broad range of patients although studies show that the patients who have the highest success are females, younger patients, those with milder disease, and less severe obesity. 3, 6.   The chances of success are lower in patients who have more severe DIRK, are older, and those who are morbidly obese.     Example of an oral appliance   Finding a dentist that practices dental sleep medicine  Specific training is available through the American Academy of Dental Sleep Medicine for dentists interested in working in the field of sleep. To find a dentist who is educated in the field of sleep and the use of oral appliances, near you, visit the Web site of the American Academy of Dental Sleep Medicine; also see   http://www.accpstorage.org/newOrganization/patients/oralAppliances.pdf  To search for a dentist certified in these practices:  Http://aadsm.org/FindADentist.aspx?1  1. Hollie, et al. Objectively measured vs self-reported compliance during oral appliance therapy for sleep-disordered breathing. Chest 2013; 144(5): 2002-1506.  2. To, et al. Objective measurement of compliance during oral appliance therapy for sleep-disordered breathing. Thorax 2013; 68(1): 91-96.  3. Bruce, et al. Mandibular advancement devices in 620 men and women with DIRK and snoring: tolerability and predictors of treatment success. Chest 2004; 125: 3988-6143.  4. Correa, et al. Oral appliances for snoring and DIRK: a review. Sleep 2006; 29: 244-262.  5. Juaquin et al. Oral appliance treatment for DIRK: an update. J Clin Sleep Med 2014; 10(2): 215-227.  6. Tyler, et al. Predictors of OSAH treatment outcome. J Dent Res 2007; 86: 3575-6393.    Oral Appliance  These are examples of two of many custom-made devices that are more likely to work in mild sleep apnea                                                      Oral appliances are dental mouth  pieces that fit very much like a sports mouth guards or removable orthodontic retainers. They are used to treat snoring  And obstructive sleep apnea . The device prevents the airway from collapsing by either holding the tongue or supporting the jaw in a forward position. Since oral appliances are non-invasive and easy to use, they may be considered as an early treatment option. Oral appliance therapy (OAT) involves the customization, selection, fabrication, fitting, adjustments and long-term follow-up care of specially designed oral devices, worn during sleep, which reposition the lower jaw and tongue base forward to maintain an open airway.  Custom made oral appliances are proven to be more effective than over-the-counter devices. Therefore, the over-the-counter devices are recommended not to be used as a screening tool nor as a therapeutic option.     Who gets a dental device?  Oral appliance therapy can be used as an alternative to  CPAP therapy for the treatment of mild to moderate sleep apnea and for those patients who prefer OAT to CPAP. Oral appliance therapy is a first line therapy for the treatment of primary snoring. Additionally, OAT is an option for those that cannot tolerate CPAP as therapy or who have experienced insufficient surgical results.                 Possible side effects?  Frequent but minor side effects include: excessive salivation, dry mouth, discomfort of teeth and jaw and temporary changes in the patient s bite.  Potential complications include: jaw pain, permanent occlusal changes and TMJ symptoms.  The above mentioned side effects and complications can be recognized and managed by dentists trained in dental sleep medicine.   Finding a dentist that practices dental sleep medicine  Specific training is available through the American Academy of Dental Sleep Medicine for dentists interested in working in the field of sleep. To find a dentist who is educated in the field of sleep and the  use of oral appliances, near you, visit the Web site of the American Academy of Dental Sleep Medicine; also see http://www.accpstorage.org/newOrganization/patients/oralAppliances.pdf   To search for a dentist certified in these practices:  Http://aadsm.org/FindADentist.aspx?1  Http://www.accpstorage.org/newOrganization/patients/oralAppliances.pdf        Your blood pressure was checked while you were in clinic today.  Please read the guidelines below about what these numbers mean and what you should do about them.  Your systolic blood pressure is the top number.  This is the pressure when the heart is pumping.  Your diastolic blood pressure is the bottom number.  This is the pressure in between beats.  If your systolic blood pressure is less than 120 and your diastolic blood pressure is less than 80, then your blood pressure is normal. There is nothing more that you need to do about it  If your systolic blood pressure is 120-139 or your diastolic blood pressure is 80-89, your blood pressure may be higher than it should be.  You should have your blood pressure re-checked within a year by a primary care provider.  If your systolic blood pressure is 140 or greater or your diastolic blood pressure is 90 or greater, you may have high blood pressure.  High blood pressure is treatable, but if left untreated over time it can put you at risk for heart attack, stroke, or kidney failure.  You should have your blood pressure re-checked by a primary care provider within the next four weeks.  Your BMI is Body mass index is 32.5 kg/(m^2).  Weight management is a personal decision.  If you are interested in exploring weight loss strategies, the following discussion covers the approaches that may be successful. Body mass index (BMI) is one way to tell whether you are at a healthy weight, overweight, or obese. It measures your weight in relation to your height.  A BMI of 18.5 to 24.9 is in the healthy range. A person with a BMI of 25  to 29.9 is considered overweight, and someone with a BMI of 30 or greater is considered obese. More than two-thirds of American adults are considered overweight or obese.  Being overweight or obese increases the risk for further weight gain. Excess weight may lead to heart disease and diabetes.  Creating and following plans for healthy eating and physical activity may help you improve your health.  Weight control is part of healthy lifestyle and includes exercise, emotional health, and healthy eating habits. Careful eating habits lifelong are the mainstay of weight control. Though there are significant health benefits from weight loss, long-term weight loss with diet alone may be very difficult to achieve- studies show long-term success with dietary management in less than 10% of people. Attaining a healthy weight may be especially difficult to achieve in those with severe obesity. In some cases, medications, devices and surgical management might be considered.  What can you do?  If you are overweight or obese and are interested in methods for weight loss, you should discuss this with your provider.     Consider reducing daily calorie intake by 500 calories.     Keep a food journal.     Avoiding skipping meals, consider cutting portions instead.    Diet combined with exercise helps maintain muscle while optimizing fat loss. Strength training is particularly important for building and maintaining muscle mass. Exercise helps reduce stress, increase energy, and improves fitness. Increasing exercise without diet control, however, may not burn enough calories to loose weight.       Start walking three days a week 10-20 minutes at a time    Work towards walking thirty minutes five days a week     Eventually, increase the speed of your walking for 1-2 minutes at time    In addition, we recommend that you review healthy lifestyles and methods for weight loss available through the National Institutes of Health patient  information sites:  http://win.niddk.nih.gov/publications/index.htm    And look into health and wellness programs that may be available through your health insurance provider, employer, local community center, or norman club.                Follow-ups after your visit        Additional Services     SLEEP DENTAL REFERRAL       Dental appliance resources recommended by [facility]     Below is a list of dental appliance resources recommended by [facility]   If you wish to choose your own dental sleep dentist, you may identify a provider close to you: http://www.[email or web address]/[email or web address]    [facility]   Sleep Medicine [facility]   Mark Pham DDS, MS   [facility]  [address] [place] [address] [address]  [place] [address]   Appointments: [phone]  Fax: [phone]   Email: [email or web address]   Dental and Oral Surgery Clinic   EARL Wheeler DDS   [address]   [place] [address]   Appointments: [phone]   Website: [email or web address]    Snoring and Sleep Apnea   [facility]   SERGIO Hooks DDS  [address], [address]   [place] [address]   Appointments: [phone]   Fax: [phone]   Email: [email or web address]  Website: [employer]     [facility]   Office 1   Yakov House DDS - [DME Medicare]  [address], [address]   [place] [address]  Appointments: [phone]  Fax: [phone]  Website: Sviral    [facility]   Office 2  Yakov House DDS - [DME Medicare]  [address], [address]  [place] [address]  Appointments: [phone]  Fax: [phone]  Website: Sviral    [facility]  Marlo Corona DDS  [address] [place] [address]  Appointments: [phone]    [facility]   [place] Office   Mahesh Kirkpatrick DDS   [place] [address], [address]   [place] [address]   Appointments: [phone]   Fax: [phone]   Website: [email or web address]     [facility]   [place] Office   Uday Chang DDS, MS - [DME Medicare]  [facility]   [address]   [place] [address]   Appointments: [phone]   Fax: [phone]   Website: [email or web address]     [facility]  Puneet Butt DMD, MSD - [DME Medicare]  [address], [address]  [place] [address]  Appointments [phone]  Fax: [phone]  Website: [employer]    [facility]   [place] Office   Chloe Dietrich DDS, PhD, [facility]   [address], [address]   [place] [address]   Appointments: [phone]   Fax: [phone]   Website: [employer]     [facility]   [place] Office   Chloe Dietrich DDS, PhD, [facility] [place]   [address]   [place] [address]   Appointments: [phone]   Fax: [phone]   Website: [employer]     [facility]  Fatoumata Shrestha DDS  [facility]  [address] [place] [address]  Appointments: [phone]   Fax: [phone]    If you wish to choose your own dental sleep dentist, you may identify a provider close to you: [email or web address]      AHI: 5.3/hr   Home sleep study DATE: 4/24/18     Return to clinic in 6-12 months for Home Sleep Test to confirm adequacy of Treatment.    Other information regarding this referral: Mandibular repositioning appliance for DIRK. If your insurance asks for a CPT code, it is .    Please be aware that coverage of these services is subject to the terms and limitations of your health insurance plan.  Call member services  "at your health plan with any benefit or coverage questions.      Please bring the following to your appointment:    >>   List of current medications   >>   This referral request   >>   Any documents/labs given to you for this referral                  Who to contact     If you have questions or need follow up information about today's clinic visit or your schedule please contact [facility] directly at [phone].  Normal or non-critical lab and imaging results will be communicated to you by Turbogenhart, letter or phone within 4 business days after the clinic has received the results. If you do not hear from us within 7 days, please contact the clinic through Nexttt or phone. If you have a critical or abnormal lab result, we will notify you by phone as soon as possible.  Submit refill requests through Infobright or call your pharmacy and they will forward the refill request to us. Please allow 3 business days for your refill to be completed.          Additional Information About Your Visit        Turbogenhart Information     Infobright gives you secure access to your electronic health record. If you see a primary care provider, you can also send messages to your care team and make appointments. If you have questions, please call your primary care clinic.  If you do not have a primary care provider, please call [phone] and they will assist you.        Care EveryWhere ID     This is your Care EveryWhere ID. This could be used by other organizations to access your [facility] medical records  [id number]        Your Vitals Were     Pulse Respirations Height Pulse Oximetry BMI (Body Mass Index)       68 12 1.815 m (5' 11.46\") 95% 32.5 kg/m2        Blood Pressure from Last 3 Encounters:   05/02/18 118/78   04/19/18 121/63   03/20/18 117/76    Weight from Last 3 Encounters:   05/02/18 107 kg (236 lb)   04/19/18 107.3 kg (236 lb 9.6 oz)   03/20/18 107 kg (236 lb)              We Performed the Following     " SLEEP DENTAL REFERRAL        Primary Care Provider Office Phone # Fax #    Kenneth G Pallas, [phone]       [facility] [address]  [facility] [address]        Equal Access to Services     MILAN OLSEN : Hadii aad ku hadlucianoo Soomaali, waaxda luqadaha, qaybta kaalmada adeegyada, iva gallegosn dilcia park laEufemiaemma walker. So [facility] [phone].    ATENCIÓN: Si habla español, tiene a sherman disposición servicios gratuitos de asistencia lingüística. [facility] [phone].    We comply with applicable federal civil rights laws and [place] laws. We do not discriminate on the basis of race, color, national origin, age, disability, sex, sexual orientation, or gender identity.            Thank you!     Thank you for choosing [facility]  for your care. Our goal is always to provide you with excellent care. Hearing back from our patients is one way we can continue to improve our services. Please take a few minutes to complete the written survey that you may receive in the mail after your visit with us. Thank you!             Your Updated Medication List - Protect others around you: Learn how to safely use, store and throw away your medicines at www.disposemymeds.org.          This list is accurate as of 5/2/18  4:14 PM.  Always use your most recent med list.                   Brand Name Dispense Instructions for use Diagnosis    ibuprofen 200 MG tablet    ADVIL/MOTRIN     Take 600 mg by mouth every 4 hours as needed for mild pain or moderate pain        SIMVASTATIN PO      Take 20 mg by mouth At Bedtime        TYLENOL PO      Take 1,000 mg by mouth every 8 hours as needed for mild pain or fever        WELLBUTRIN PO      Take 150 mg by mouth 2 times daily

## 2018-05-02 NOTE — PROGRESS NOTES
Sleep Study Follow-Up Visit:    Date on this visit: 5/2/2018    ASSESSMENT / PLAN:    DIRK (obstructive sleep apnea)  Discussed with patient the recent sleep study and treatment options, we recommend oral appliance for the treatment of his mild DIRK and/or socially disrupting snoring. Avoid sleeping supine position and weight loss as below.  Instructions given. Follow up 6 months after oral device use and repeat home sleep study to determine efficacy of oral device.  Counseled regarding weight loss through diet modification and increased physical activity. Patient was given instuctions of weight loss and advised to follow up her PCP for further weight loss interventions.      All questions were answered.  The patient indicates understanding of the above issues and agrees with the plan set forth.      Orders Placed This Encounter   Procedures     SLEEP DENTAL REFERRAL       He will follow up with me in about 6 month(s).       BRIEF SUMMARY:    Bud Chiang is a 58 year old male with history of depression, hyperlipidemia, lumbar stenosis and herniated disc s/p surgery comes in today for follow-up of his sleep study done on 4/24/18 at the [facility] for result of sleep study. Please see H&P for his presenting sleep symptoms for additional details.    Home sleep study: 4/24/18   AHI 5.3/hr  RHONDA 4.3/hr  Snore index 23.4%  Lowest O 2 saturation is 74%  Time spent O 2 saturation below 88% was 0 minutes    These findings were reviewed with the patient and copy of the sleep study result was given.    Past medical/surgical history, family history, social history, medications and allergies were reviewed.      Problem List:  Patient Active Problem List    Diagnosis Date Noted     Lumbar stenosis with neurogenic claudication 01/25/2018     Priority: Medium     Lumbar disc herniation with radiculopathy 01/25/2018     Priority: Medium             Medications:     Current Outpatient Prescriptions   Medication Sig      "Acetaminophen (TYLENOL PO) Take 1,000 mg by mouth every 8 hours as needed for mild pain or fever     BuPROPion HCl (WELLBUTRIN PO) Take 150 mg by mouth 2 times daily      ibuprofen (ADVIL/MOTRIN) 200 MG tablet Take 600 mg by mouth every 4 hours as needed for mild pain or moderate pain     SIMVASTATIN PO Take 20 mg by mouth At Bedtime     No current facility-administered medications for this visit.           PHYSICAL EXAMINATION:  /78  Pulse 68  Resp 12  Ht 1.815 m (5' 11.46\")  Wt 107 kg (236 lb)  SpO2 95%  BMI 32.5 kg/m2          Data: All pertinent previous laboratory data reviewed     No results found for: PH, PHARTERIAL, PO2, [id number], SAT, PCO2, HCO3, BASEEXCESS, LAYLA, BEB  No results found for: TSH  Lab Results   Component Value Date    GLC 89 01/25/2018     Lab Results   Component Value Date    HGB 15.6 01/25/2018     Lab Results   Component Value Date    BUN 20 01/25/2018    CR 1.00 01/25/2018     No results found for: EVE     Fifteen minutes spent with patient, all of which were spent face-to-face counseling, consulting, coordinating plan of care, going over sleep test results and chart review.          Vishnu Tovar MD 5/2/2018   [facility]  [address], [place] [address] [phone] Clinic      CC: No ref. provider found  Copy to: Pallas, Kenneth G    "

## 2018-12-07 ENCOUNTER — PATIENT OUTREACH (OUTPATIENT)
Dept: CARE COORDINATION | Facility: CLINIC | Age: 58
End: 2018-12-07

## 2018-12-17 ENCOUNTER — OFFICE VISIT (OUTPATIENT)
Dept: NEUROLOGY | Facility: CLINIC | Age: 58
End: 2018-12-17
Payer: COMMERCIAL

## 2018-12-17 VITALS
BODY MASS INDEX: 32.28 KG/M2 | OXYGEN SATURATION: 97 % | HEART RATE: 61 BPM | DIASTOLIC BLOOD PRESSURE: 76 MMHG | WEIGHT: 230.6 LBS | TEMPERATURE: 98.2 F | HEIGHT: 71 IN | SYSTOLIC BLOOD PRESSURE: 113 MMHG | RESPIRATION RATE: 14 BRPM

## 2018-12-17 DIAGNOSIS — G83.4 CES (CAUDA EQUINA SYNDROME) (H): Primary | ICD-10-CM

## 2018-12-17 DIAGNOSIS — S93.401A SPRAIN OF RIGHT ANKLE, UNSPECIFIED LIGAMENT, INITIAL ENCOUNTER: ICD-10-CM

## 2018-12-17 ASSESSMENT — MIFFLIN-ST. JEOR: SCORE: 1893.12

## 2018-12-17 ASSESSMENT — PAIN SCALES - GENERAL: PAINLEVEL: MODERATE PAIN (5)

## 2018-12-17 NOTE — NURSING NOTE
Chief Complaint   Patient presents with     RECHECK     [facility] RETURN - Continued weakness, numbness in feet      Preventive Care:    Colorectal Cancer Screening: During our visit today, we discussed that it is recommended you receive colorectal cancer screening. Please call or make an appointment with your primary care provider to discuss this. You may also call the  [facility] scheduling line ([phone]) to set up a colonoscopy appointment.      Teena Hernandez, CMA

## 2018-12-17 NOTE — NURSING NOTE
Preventive Care:    Colorectal Cancer Screening: During our visit today, we discussed that it is recommended you receive colorectal cancer screening. Please call or make an appointment with your primary care provider to discuss this. You may also call the [facility] scheduling line ([phone]) to set up a colonoscopy appointment.

## 2018-12-17 NOTE — LETTER
2018       RE: Bud Chiang  [address]  [facility] [address]     Dear Colleague,    Thank you for referring your patient, Bud Chiang, to the [facility] NEUROLOGY at [facility]. Please see a copy of my visit note below.    Service Date: 2018      Kenneth Pallas, MD   [facility]   [address]  [address]      RE: Bud Chiang   MRN: [id number]   : 1960      Dear Dr. Pallas:      This is in regard to followup on Bud Chiang.  The patient returned today with chief complaint of right ankle and foot weakness, persistent paresthesias and mild low back pain after decompression of a large L4-L5 central disk herniation with cauda equina syndrome.      I have not seen the patient since I referred him acutely to Dr. Lilly Tijerina in the Department of Neurosurgery here at [facility].  He ended up getting urgent decompression of a large disk herniation occupying 80% of the canal volume and causing complete effacement of the CSF with severe stenosis.  This was at the L4-L5 level.  He did undergo bilateral L4-L5 hemilaminectomy and bilateral microdiscectomy.  He has done quite well post surgery.  He did note that he has had persistent paresthesias involving the right heel and also the left toes.  He did have urinary urgency and that went away with surgery.  He never had any decreased perirectal or genital sensation and no bowel incontinence.  He has not been impotent.  He has some mild residual lower back pain.  He has been quite careful and avoids any heavy lifting.  He did note that he attempted to trout fish in March and twisted his right ankle.  He said that ever since he has had pain in that ankle.  It was markedly swollen for about a month.  He said he did have x-rays done and evidently these were interpreted as normal.  He feels that his balance is off because of his right and left foot complaints.  He has  not suffered any falls.  The patient was given gabapentin by one of your associates.  He took 600 mg at bedtime.  This did help some of these complaints.  He denied any depression on the medicine, dizziness nor any trouble with daytime sedation or trouble driving.  He stopped the medication as he felt he just did not want to take a medication if he could avoid it.  He has continued though on simvastatin as well as Wellbutrin.  He has not had any change in those medication dosages.  He denied any new medical history since I last saw him and there is no new family history of medical problems.  He did remind me he does suffer from mild psoriasis.  The patient has continued to work for [employer].  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        The patient did review his clinical course from my consultation of 01/24/2018.  He said it was correct.  The patient did have previous surgery of the left patellar repair.  He has no listed drug allergies.  He does not drink to excess and does not smoke.  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  The patient has not had a followup MRI scan since his study 01/22/2017.      Neurologic examination revealed a pleasant man.  Gait, station, cerebellar testing except the need to concentrate to do tandem with negative Romberg, muscle stretch reflexes except for an absent right ankle jerk as well as internal hamstring reflexes with hypoactive arm reflexes and normal knee jerks, plantar stimulation, strength testing, which was all normal except for moderate weakness of the right toe extensors but also inability to elevate his weight on his right toes, limited cranial nerve examination, superficial and cortical sensory testing except for decreased sensation to pinprick involving the right heel and the bottom of the left toes.  The patient did have mild corkscrewing upon coming up  from a flexed position involving his lower back, and he had mild increased paraspinal muscle tone in the right lumbar paraspinal muscle area.  His lungs were clear to auscultation.  He did not have cervical bruits.  He had a regular cardiac rhythm without gallops or murmurs.  His blood pressure is 113/76, pulse 61.      IMPRESSION:   1.  Previously known cauda equina syndrome secondary to huge central herniated disk with successful surgical decompression.   2.  Residual sensory complaints involving both feet with possible weakness of the right leg.   3.  Right ankle sprain.      I recommended the patient go ahead now and have an MRI scan of the lumbar spine.  This will help rule out residual disk issues and/or central canal stenosis as well as foraminal narrowing.  In addition, EMG testing would be helpful in this setting.  Hopefully, we can fit it in the next few weeks.  The patient is going to continue to obey restrictions.  With his right ankle complaints, I did ask that he be seen in our [facility] to assess whether he has a separate problem involving his ankle secondary to sprain.  He is going to have neurologic follow up with me in January and on a p.r.n. basis.  He did go over with me gabapentin and he may resume it and I have asked that this be done under you or your partners' direction.  He did not have any trouble on the medication and it was helpful to him.  I did warn him about side effects including daytime sedation and trouble driving as well as operating heavy machinery and depression as well as dizziness, nausea, weight gain, edema and blurred vision.      I spent 40 minutes with the patient today.  Over 50% of the time involved counseling and coordination of care.        Thank you for allowing me to see this patient.      Sincerely,      Catarino Ponce MD

## 2018-12-18 NOTE — PROGRESS NOTES
Service Date: 2018      Kenneth Pallas, MD   [facility]   [address] [place]  [address]      RE: Bud Chiang   MRN: [id number]   : 1960      Dear Dr. Pallas:      This is in regard to followup on Bud Chiang.  The patient returned today with chief complaint of right ankle and foot weakness, persistent paresthesias and mild low back pain after decompression of a large L4-L5 central disk herniation with cauda equina syndrome.      I have not seen the patient since I referred him acutely to Dr. Lilly Tijerina in the Department of Neurosurgery here at [facility].  He ended up getting urgent decompression of a large disk herniation occupying 80% of the canal volume and causing complete effacement of the CSF with severe stenosis.  This was at the L4-L5 level.  He did undergo bilateral L4-L5 hemilaminectomy and bilateral microdiscectomy.  He has done quite well post surgery.  He did note that he has had persistent paresthesias involving the right heel and also the left toes.  He did have urinary urgency and that went away with surgery.  He never had any decreased perirectal or genital sensation and no bowel incontinence.  He has not been impotent.  He has some mild residual lower back pain.  He has been quite careful and avoids any heavy lifting.  He did note that he attempted to trout fish in March and twisted his right ankle.  He said that ever since he has had pain in that ankle.  It was markedly swollen for about a month.  He said he did have x-rays done and evidently these were interpreted as normal.  He feels that his balance is off because of his right and left foot complaints.  He has not suffered any falls.  The patient was given gabapentin by one of your associates.  He took 600 mg at bedtime.  This did help some of these complaints.  He denied any depression on the medicine, dizziness nor any trouble with daytime sedation or trouble driving.  He stopped the  medication as he felt he just did not want to take a medication if he could avoid it.  He has continued though on simvastatin as well as Wellbutrin.  He has not had any change in those medication dosages.  He denied any new medical history since I last saw him and there is no new family history of medical problems.  He did remind me he does suffer from mild psoriasis.  The patient has continued to work for [employer].  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        The patient did review his clinical course from my consultation of 01/24/2018.  He said it was correct.  The patient did have previous surgery of the left patellar repair.  He has no listed drug allergies.  He does not drink to excess and does not smoke.  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  The patient has not had a followup MRI scan since his study 01/22/2017.      Neurologic examination revealed a pleasant man.  Gait, station, cerebellar testing except the need to concentrate to do tandem with negative Romberg, muscle stretch reflexes except for an absent right ankle jerk as well as internal hamstring reflexes with hypoactive arm reflexes and normal knee jerks, plantar stimulation, strength testing, which was all normal except for moderate weakness of the right toe extensors but also inability to elevate his weight on his right toes, limited cranial nerve examination, superficial and cortical sensory testing except for decreased sensation to pinprick involving the right heel and the bottom of the left toes.  The patient did have mild corkscrewing upon coming up from a flexed position involving his lower back, and he had mild increased paraspinal muscle tone in the right lumbar paraspinal muscle area.  His lungs were clear to auscultation.  He did not have cervical bruits.  He had a regular cardiac rhythm without gallops or murmurs.  His  blood pressure is 113/76, pulse 61.      IMPRESSION:   1.  Previously known cauda equina syndrome secondary to huge central herniated disk with successful surgical decompression.   2.  Residual sensory complaints involving both feet with possible weakness of the right leg.   3.  Right ankle sprain.      I recommended the patient go ahead now and have an MRI scan of the lumbar spine.  This will help rule out residual disk issues and/or central canal stenosis as well as foraminal narrowing.  In addition, EMG testing would be helpful in this setting.  Hopefully, we can fit it in the next few weeks.  The patient is going to continue to obey restrictions.  With his right ankle complaints, I did ask that he be seen in our [facility] to assess whether he has a separate problem involving his ankle secondary to sprain.  He is going to have neurologic follow up with me in January and on a p.r.n. basis.  He did go over with me gabapentin and he may resume it and I have asked that this be done under you or your partners' direction.  He did not have any trouble on the medication and it was helpful to him.  I did warn him about side effects including daytime sedation and trouble driving as well as operating heavy machinery and depression as well as dizziness, nausea, weight gain, edema and blurred vision.      I spent 40 minutes with the patient today.  Over 50% of the time involved counseling and coordination of care.        Thank you for allowing me to see this patient.      Sincerely,      MD LACIE Figueroa MD             D: 2018   T: 2018   MT: sejal      Name:     GAUDENCIO ARMENTA   MRN:      [id number]        Account:      [id number]   :      1960           Service Date: 2018      Document: [id number]

## 2018-12-21 ENCOUNTER — CARE COORDINATION (OUTPATIENT)
Dept: NEUROLOGY | Facility: CLINIC | Age: 58
End: 2018-12-21

## 2018-12-28 ENCOUNTER — TRANSFERRED RECORDS (OUTPATIENT)
Dept: HEALTH INFORMATION MANAGEMENT | Facility: CLINIC | Age: 58
End: 2018-12-28

## 2019-02-21 ENCOUNTER — OFFICE VISIT (OUTPATIENT)
Dept: NEUROLOGY | Facility: CLINIC | Age: 59
End: 2019-02-21
Payer: COMMERCIAL

## 2019-02-21 VITALS
HEART RATE: 67 BPM | SYSTOLIC BLOOD PRESSURE: 127 MMHG | BODY MASS INDEX: 31.91 KG/M2 | OXYGEN SATURATION: 98 % | DIASTOLIC BLOOD PRESSURE: 83 MMHG | WEIGHT: 228.8 LBS

## 2019-02-21 DIAGNOSIS — M54.16 LUMBAR RADICULOPATHY: Primary | ICD-10-CM

## 2019-02-21 DIAGNOSIS — G83.4 CAUDA EQUINA COMPRESSION (H): ICD-10-CM

## 2019-02-21 RX ORDER — GABAPENTIN 300 MG/1
300 CAPSULE ORAL 3 TIMES DAILY
Qty: 270 CAPSULE | Refills: 3 | Status: SHIPPED | OUTPATIENT
Start: 2019-02-21 | End: 2020-02-21

## 2019-02-21 ASSESSMENT — ENCOUNTER SYMPTOMS
MUSCLE CRAMPS: 1
SINUS CONGESTION: 0
PARALYSIS: 0
NECK MASS: 0
TREMORS: 0
DIZZINESS: 0
MYALGIAS: 0
TROUBLE SWALLOWING: 0
JOINT SWELLING: 0
DISTURBANCES IN COORDINATION: 0
SORE THROAT: 0
HEADACHES: 0
SMELL DISTURBANCE: 0
MEMORY LOSS: 0
BACK PAIN: 0
SINUS PAIN: 0
MUSCLE WEAKNESS: 0
NUMBNESS: 1
TINGLING: 1
ARTHRALGIAS: 0
STIFFNESS: 0
HOARSE VOICE: 0
TASTE DISTURBANCE: 0
LOSS OF CONSCIOUSNESS: 0
SPEECH CHANGE: 0
SEIZURES: 0
WEAKNESS: 0
NECK PAIN: 0

## 2019-02-21 ASSESSMENT — PAIN SCALES - GENERAL: PAINLEVEL: MODERATE PAIN (5)

## 2019-02-21 NOTE — LETTER
2019       RE: Bud Chiang  [address]  [facility] [address]     Dear Colleague,    Thank you for referring your patient, Bud Chiang, to the [facility] NEUROLOGY at [facility]. Please see a copy of my visit note below.    Service Date: 2019      Kenneth G. Pallas, MD   [facility]   [address] [place] [address]      RE: Bud Chiang   MRN: [id number]   : 1960      Dear Dr. Pallas:      This is in regard to followup on Bud Chiang.  The patient returned today with a chief complaint of prior cauda equina syndrome secondary to a large L4-L5 central disk herniation.  His other chief complaint is that of persistent numbness of his feet since he had that condition treated surgically as well as weakness of the right foot and ankle.      The patient saw me initially on 2018.  He was unable to fit an EMG in his schedule but promised to have it done shortly now.  He did undergo MRI scan testing.  He was able to bring in those outside films and they have been entered into our system.  These were done on 2018.  The radiologist was Dr. Hi MD.  There was comparison to the previous MRI scan that we have in our system that was done at the same facility.  The study did show at L4-L5 an interval laminectomy change.  The patient still had a small superior migrated left paracentral disk protrusion that effaced the left thecal sac and contacts the left L5 nerve root.  There was enhancing epidural fibrosis and surrounding the thecal sac and encroaching on the L5 nerve roots.  There was similar mild to moderate foraminal narrowing.  The canal caliber had improved.  A previously seen disk herniation L2-L3 had resolved.  There was minimal right foraminal stenosis and mid thecal sac narrowing.  At L3-L4 a previously seen disk herniation had also resolved.  There was improved minor thecal sac narrowing and  similar mild foraminal narrowing.  At L5-S1, the patient had mild foraminal stenosis, left greater than right, similar to prior study.      I did suggest to the patient that the changes affecting the L5 nerve roots may be responsible for his static continued complaints after the surgery.  He is aware that he had a huge extruded lumbar disk with marked narrowing of his central canal then.  I went over the original films with him too.  He did discuss with me his issues with his job description.  I pointed out to him again that he cannot lift beyond 25 pounds indefinitely and should avoid repetitive bending and stooping.  He is going to look into how this is going to affect his ability to continue to do his present employment.  He is going to keep in touch with me.  He has had no trouble taking gabapentin 600 mg at bedtime.  I did review with him potential risk including daytime sedation and trouble driving or operating heavy machinery, depression, suicidal thoughts, dizziness, blurred vision, nausea, weight gain and edema.  He told me that he has had none of these complaints and he said his previous depression is fine now.  He feels upbeat on Wellbutrin and has had no trouble taking medication.  He is going to go to 900 to possibly 1200 mg a day in divided dosage to see if this helps his continued leg complaints.  He will keep in touch with me by phone and he understands he may need to have neurologic followup with me.      Thank you for allowing me to see this patient.       Sincerely yours,       Catarino Ponce MD      I spent 25 minutes with the patient today.  Over 50% of the time this involved counseling and coordination of care.      D: 2019   T: 2019   MT: LAURA      Name:     GAUDENCIO ARMENTA   MRN:      [id number]        Account:      [id number]   :      1960           Service Date: 2019      Document: [id number]

## 2019-02-22 NOTE — PROGRESS NOTES
Service Date: 2019      Kenneth G. Pallas, MD   [facility]   [address] [place] [address]      RE: Bud Chiang   MRN: [id number]   : 1960      Dear Dr. Pallas:      This is in regard to followup on Bud Chiang.  The patient returned today with a chief complaint of prior cauda equina syndrome secondary to a large L4-L5 central disk herniation.  His other chief complaint is that of persistent numbness of his feet since he had that condition treated surgically as well as weakness of the right foot and ankle.      The patient saw me initially on 2018.  He was unable to fit an EMG in his schedule but promised to have it done shortly now.  He did undergo MRI scan testing.  He was able to bring in those outside films and they have been entered into our system.  These were done on 2018.  The radiologist was Dr. Hi MD.  There was comparison to the previous MRI scan that we have in our system that was done at the same facility.  The study did show at L4-L5 an interval laminectomy change.  The patient still had a small superior migrated left paracentral disk protrusion that effaced the left thecal sac and contacts the left L5 nerve root.  There was enhancing epidural fibrosis and surrounding the thecal sac and encroaching on the L5 nerve roots.  There was similar mild to moderate foraminal narrowing.  The canal caliber had improved.  A previously seen disk herniation L2-L3 had resolved.  There was minimal right foraminal stenosis and mid thecal sac narrowing.  At L3-L4 a previously seen disk herniation had also resolved.  There was improved minor thecal sac narrowing and similar mild foraminal narrowing.  At L5-S1, the patient had mild foraminal stenosis, left greater than right, similar to prior study.      I did suggest to the patient that the changes affecting the L5 nerve roots may be responsible for his static continued complaints after the  surgery.  He is aware that he had a huge extruded lumbar disk with marked narrowing of his central canal then.  I went over the original films with him too.  He did discuss with me his issues with his job description.  I pointed out to him again that he cannot lift beyond 25 pounds indefinitely and should avoid repetitive bending and stooping.  He is going to look into how this is going to affect his ability to continue to do his present employment.  He is going to keep in touch with me.  He has had no trouble taking gabapentin 600 mg at bedtime.  I did review with him potential risk including daytime sedation and trouble driving or operating heavy machinery, depression, suicidal thoughts, dizziness, blurred vision, nausea, weight gain and edema.  He told me that he has had none of these complaints and he said his previous depression is fine now.  He feels upbeat on Wellbutrin and has had no trouble taking medication.  He is going to go to 900 to possibly 1200 mg a day in divided dosage to see if this helps his continued leg complaints.  He will keep in touch with me by phone and he understands he may need to have neurologic followup with me.      Thank you for allowing me to see this patient.       Sincerely yours,       Lacie Ponce MD      I spent 25 minutes with the patient today.  Over 50% of the time this involved counseling and coordination of care.         LACIE PONCE MD             D: 2019   T: 2019   MT: LAURA      Name:     GAUDENCIO ARMENTA   MRN:      [id number]        Account:      [id number]   :      1960           Service Date: 2019      Document: [id number]

## 2019-02-25 NOTE — PROGRESS NOTES
RE: Bud Chiang   MRN: [id number]   : 1960      To Whom It May Concern:      Mr. Bud Chiang has been under my care for a cauda equina syndrome secondary to a massive disk herniation involving the L4-L5 level of his spine.  The patient cannot lift beyond 25 pounds indefinitely and he cannot do any type of work which would entail repetitive bending or stooping.  These are permanent restrictions.      If you should have any questions, please feel free to contact me.      Sincerely yours,       Lacie Ponce MD         Sincerely,      LACIE PONCE MD             D: 2019   T: 2019   MT: LAURA      Name:     BUD CHIANG   MRN:      [id number]        Account:      [id number]   :      1960      Document: [id number]

## 2019-03-12 ENCOUNTER — OFFICE VISIT (OUTPATIENT)
Dept: NEUROLOGY | Facility: CLINIC | Age: 59
End: 2019-03-12
Attending: PSYCHIATRY & NEUROLOGY
Payer: COMMERCIAL

## 2019-03-12 DIAGNOSIS — M54.16 LUMBAR RADICULOPATHY: ICD-10-CM

## 2019-03-12 DIAGNOSIS — G83.4 CAUDA EQUINA COMPRESSION (H): ICD-10-CM

## 2019-03-12 NOTE — LETTER
3/12/2019       RE: Bud Chiang  [address]  [facility] [address]     Dear Colleague,    Thank you for referring your patient, Bud Chiang, to the [facility] EMG at [facility]. Please see a copy of my visit note below.        [facility]  Electrodiagnostic Laboratory    Nerve Conduction & EMG Report    Patient: Bud Chiang  YOB: 1960  Patient ID: [id number]  Age: 58 Years 11 Months  Gender: Male      Referring Physician: Catarino Ponce MD    History & Examination:    58 year old man status post decompression of L4-5 space due to a large centrally herniated disc causing cauda equina syndrome in 1/2018. He has persistent paresthesias at the right heel and left dorsum of the foot. Query lumbosacral radiculopathies vs polyneuropathy. There is no pre-operative EMG available for comparison.    Techniques:    Sensory and motor conduction studies were done with surface recording electrodes. EMG was done with a concentric needle electrode.     Results:    Bilateral sural and superficial peroneal antidromic sensory NCSs were normal. Right deep peroneal and bilateral tibial motor NCSs were normal. Left deep peroneal motor NCS showed attenuated CMAP amplitudes without segmental changes, normal distal latency, and normal conduction velocities. Right tibial F-waves were normal. EMG showed changes of chronic denervation/reinnervation in muscles innervated by L5 and S1 segments/myotomes bilaterally, as tabulated. Spontaneous activity was present at bilateral medial gastrocnemii and left tibialis posterior, as tabulated. L4-innervated muscles (right vastus lateralis and left vastus medialis) were normal.     Interpretation:    Abnormal study. There is electrodiagnostic evidence of chronic bilateral L5 and S1 radiculopathies, with active denervation changes at the left L5 and bilateral S1 segments. There is no electrodiagnostic evidence of  polyneuropathy.     EMG Physician:    Sulaiman Amaya MD      Sensory NCS       Nerve / Sites Rec. Site Onset Peak Ref. NP Amp Ref. PP Amp Dist Shaheen Ref. Temp     ms ms ms  V  V  V cm m/s m/s  C   R SURAL - Lat Mall      Calf Ankle 3.28 4.17  16.0 8.0 15.0 14 42.7 38.0 28.9      Calf Ankle 3.13 4.01  16.0 8.0 16.2 14 44.8  28.9   L SURAL - Lat Mall      Calf Ankle 2.81 3.70  19.5 8.0 21.1 14 49.8 38.0 31.2   R SUP PERONEAL      Lat Leg Myers 2.92 3.80  13.7  16.8 12.5 42.9 38.0 28.8   L SUP PERONEAL      Lat Leg Myers 2.50 3.18  8.1  10.1 12.5 50.0 38.0 31.3      Lat Leg Myers 2.34 3.18 4.50 8.8  11.1 12.5 53.3  31.3       Motor NCS      Nerve / Sites Rec. Site Lat Ref. Amp Ref. Rel Amp Dist Shaheen Ref. Dur. Area Temp.     ms ms mV mV % cm m/s m/s ms %  C   R DEEP PERONEAL - EDB 60      Ankle EDB 4.74 6.00 4.3 2.0 100 8   5.57 100 28.7      FibHead EDB 13.13  3.4  80.4 37 44.1 38.0 6.72 72.2 28.7      Pop Fos EDB 15.05  3.2  74.8 9 46.7 38.0 6.77 72.3 28.7   L DEEP PERONEAL - EDB 60      Ankle EDB 4.69 6.00 2.2 2.0 100 8   6.09 100 31.5      FibHead EDB 13.02  1.6  73 35 42.0 38.0 6.67 69.2 31.5      Pop Fos EDB 14.79  1.4  63.6 8 45.2 38.0 6.82 69 31.5   R TIBIAL - AH      Ankle AH 4.22 6.00 10.3 4.0 100 8   6.20 100 28.4      Pop Fos AH 14.95  5.6  54.8 42 39.1 38.0 7.29 67.3 28.4   L TIBIAL - AH      Ankle AH 4.48 6.00 12.3 4.0 100 8   6.41 100 29.6       F  Wave      Nerve Min F Lat Max F Lat Mean FLat Temp.    ms ms ms  C   R TIBIAL 51.72 53.91 52.72 28.3       EMG Summary Table     Spontaneous MUAP Recruitment    IA Fib/PSW Fasc H.F. Amp Dur. PPP Pattern   R. TIB ANTERIOR N None None None 1+ N None Mild Reduced   R. GASTROCN (MED) N 3+ None None 2+ 1+ 1+ Moderate Reduced   R. VAST LATERALIS N None None None N N None Normal   R. BIC FEM (S HEAD) N None None None 1+ 2+ 2+ Moderate Reduced   R. GLUTEUS MAX N None None None 1+ 1+ None Mild Reduced   R. GLUTEUS MED N None None None 1+ 1+ 1+ Normal   L. TIB ANTERIOR  Increased None None None 1+ 1+ None Mild Reduced   L. TIB POSTERIOR N 2+ None None 2+ 3+ None Mild Reduced   L. GASTROCN (MED) Increased 1+ 1+ None 2+ 1+ None Normal   L. VAST MEDIALIS N None None None N N None Normal   L. SEMITENDIN N None None None 1+ 2+ 1+ Normal   L. GLUTEUS MED N None None None 1+ 2+ None Normal                          Again, thank you for allowing me to participate in the care of your patient.      Sincerely,    Sulaiman Amaya MD

## 2019-03-12 NOTE — PROGRESS NOTES
[facility]  Electrodiagnostic Laboratory    Nerve Conduction & EMG Report          Patient: Bud Chiang  YOB: 1960  Patient ID: [id number]  Age: 58 Years 11 Months  Gender: Male      Referring Physician: Catarino Ponce MD    History & Examination:    58 year old man status post decompression of L4-5 space due to a large centrally herniated disc causing cauda equina syndrome in 1/2018. He has persistent paresthesias at the right heel and left dorsum of the foot. Query lumbosacral radiculopathies vs polyneuropathy. There is no pre-operative EMG available for comparison.    Techniques:    Sensory and motor conduction studies were done with surface recording electrodes. EMG was done with a concentric needle electrode.     Results:    Bilateral sural and superficial peroneal antidromic sensory NCSs were normal. Right deep peroneal and bilateral tibial motor NCSs were normal. Left deep peroneal motor NCS showed attenuated CMAP amplitudes without segmental changes, normal distal latency, and normal conduction velocities. Right tibial F-waves were normal. EMG showed changes of chronic denervation/reinnervation in muscles innervated by L5 and S1 segments/myotomes bilaterally, as tabulated. Spontaneous activity was present at bilateral medial gastrocnemii and left tibialis posterior, as tabulated. L4-innervated muscles (right vastus lateralis and left vastus medialis) were normal.     Interpretation:    Abnormal study. There is electrodiagnostic evidence of chronic bilateral L5 and S1 radiculopathies, with active denervation changes at the left L5 and bilateral S1 segments. There is no electrodiagnostic evidence of polyneuropathy.     EMG Physician:    Sulaiman Amaya MD        Sensory NCS       Nerve / Sites Rec. Site Onset Peak Ref. NP Amp Ref. PP Amp Dist Shaheen Ref. Temp     ms ms ms  V  V  V cm m/s m/s  C   R SURAL - Lat Mall      Calf Ankle 3.28 4.17  16.0 8.0 15.0 14 42.7 38.0 28.9       Calf Ankle 3.13 4.01  16.0 8.0 16.2 14 44.8  28.9   L SURAL - Lat Mall      Calf Ankle 2.81 3.70  19.5 8.0 21.1 14 49.8 38.0 31.2   R SUP PERONEAL      Lat Leg Myers 2.92 3.80  13.7  16.8 12.5 42.9 38.0 28.8   L SUP PERONEAL      Lat Leg Myers 2.50 3.18  8.1  10.1 12.5 50.0 38.0 31.3      Lat Leg Myers 2.34 3.18 4.50 8.8  11.1 12.5 53.3  31.3       Motor NCS      Nerve / Sites Rec. Site Lat Ref. Amp Ref. Rel Amp Dist Shaheen Ref. Dur. Area Temp.     ms ms mV mV % cm m/s m/s ms %  C   R DEEP PERONEAL - EDB 60      Ankle EDB 4.74 6.00 4.3 2.0 100 8   5.57 100 28.7      FibHead EDB 13.13  3.4  80.4 37 44.1 38.0 6.72 72.2 28.7      Pop Fos EDB 15.05  3.2  74.8 9 46.7 38.0 6.77 72.3 28.7   L DEEP PERONEAL - EDB 60      Ankle EDB 4.69 6.00 2.2 2.0 100 8   6.09 100 31.5      FibHead EDB 13.02  1.6  73 35 42.0 38.0 6.67 69.2 31.5      Pop Fos EDB 14.79  1.4  63.6 8 45.2 38.0 6.82 69 31.5   R TIBIAL - AH      Ankle AH 4.22 6.00 10.3 4.0 100 8   6.20 100 28.4      Pop Fos AH 14.95  5.6  54.8 42 39.1 38.0 7.29 67.3 28.4   L TIBIAL - AH      Ankle AH 4.48 6.00 12.3 4.0 100 8   6.41 100 29.6       F  Wave      Nerve Min F Lat Max F Lat Mean FLat Temp.    ms ms ms  C   R TIBIAL 51.72 53.91 52.72 28.3       EMG Summary Table     Spontaneous MUAP Recruitment    IA Fib/PSW Fasc H.F. Amp Dur. PPP Pattern   R. TIB ANTERIOR N None None None 1+ N None Mild Reduced   R. GASTROCN (MED) N 3+ None None 2+ 1+ 1+ Moderate Reduced   R. VAST LATERALIS N None None None N N None Normal   R. BIC FEM (S HEAD) N None None None 1+ 2+ 2+ Moderate Reduced   R. GLUTEUS MAX N None None None 1+ 1+ None Mild Reduced   R. GLUTEUS MED N None None None 1+ 1+ 1+ Normal   L. TIB ANTERIOR Increased None None None 1+ 1+ None Mild Reduced   L. TIB POSTERIOR N 2+ None None 2+ 3+ None Mild Reduced   L. GASTROCN (MED) Increased 1+ 1+ None 2+ 1+ None Normal   L. VAST MEDIALIS N None None None N N None Normal   L. SEMITENDIN N None None None 1+ 2+ 1+ Normal   L. GLUTEUS  MED N None None None 1+ 2+ None Normal

## 2019-04-04 ENCOUNTER — THERAPY VISIT (OUTPATIENT)
Dept: PHYSICAL THERAPY | Facility: CLINIC | Age: 59
End: 2019-04-04
Attending: FAMILY MEDICINE
Payer: COMMERCIAL

## 2019-04-04 ENCOUNTER — OFFICE VISIT (OUTPATIENT)
Dept: NEUROLOGY | Facility: CLINIC | Age: 59
End: 2019-04-04
Payer: COMMERCIAL

## 2019-04-04 ENCOUNTER — OFFICE VISIT (OUTPATIENT)
Dept: ORTHOPEDICS | Facility: CLINIC | Age: 59
End: 2019-04-04
Payer: COMMERCIAL

## 2019-04-04 ENCOUNTER — ANCILLARY PROCEDURE (OUTPATIENT)
Dept: GENERAL RADIOLOGY | Facility: CLINIC | Age: 59
End: 2019-04-04
Attending: FAMILY MEDICINE
Payer: COMMERCIAL

## 2019-04-04 VITALS
WEIGHT: 232.1 LBS | BODY MASS INDEX: 32.37 KG/M2 | SYSTOLIC BLOOD PRESSURE: 121 MMHG | DIASTOLIC BLOOD PRESSURE: 71 MMHG | HEART RATE: 70 BPM | OXYGEN SATURATION: 97 %

## 2019-04-04 VITALS — HEIGHT: 71 IN | BODY MASS INDEX: 32.48 KG/M2 | WEIGHT: 232 LBS | RESPIRATION RATE: 16 BRPM

## 2019-04-04 DIAGNOSIS — G89.29 CHRONIC PAIN OF RIGHT ANKLE: ICD-10-CM

## 2019-04-04 DIAGNOSIS — M54.16 LUMBAR RADICULOPATHY: ICD-10-CM

## 2019-04-04 DIAGNOSIS — M25.571 CHRONIC PAIN OF RIGHT ANKLE: ICD-10-CM

## 2019-04-04 DIAGNOSIS — M54.17 LUMBOSACRAL RADICULOPATHY AT S1: ICD-10-CM

## 2019-04-04 DIAGNOSIS — M25.571 ACUTE RIGHT ANKLE PAIN: Primary | ICD-10-CM

## 2019-04-04 DIAGNOSIS — S93.401A SPRAIN OF RIGHT ANKLE, UNSPECIFIED LIGAMENT, INITIAL ENCOUNTER: Primary | ICD-10-CM

## 2019-04-04 DIAGNOSIS — M25.571 ACUTE RIGHT ANKLE PAIN: ICD-10-CM

## 2019-04-04 DIAGNOSIS — M54.50 ACUTE BILATERAL LOW BACK PAIN WITHOUT SCIATICA: ICD-10-CM

## 2019-04-04 PROCEDURE — 97112 NEUROMUSCULAR REEDUCATION: CPT | Mod: GP | Performed by: PHYSICAL THERAPIST

## 2019-04-04 PROCEDURE — 97161 PT EVAL LOW COMPLEX 20 MIN: CPT | Mod: GP | Performed by: PHYSICAL THERAPIST

## 2019-04-04 PROCEDURE — 97530 THERAPEUTIC ACTIVITIES: CPT | Mod: GP | Performed by: PHYSICAL THERAPIST

## 2019-04-04 PROCEDURE — 97110 THERAPEUTIC EXERCISES: CPT | Mod: GP | Performed by: PHYSICAL THERAPIST

## 2019-04-04 ASSESSMENT — PAIN SCALES - GENERAL: PAINLEVEL: MODERATE PAIN (4)

## 2019-04-04 ASSESSMENT — MIFFLIN-ST. JEOR: SCORE: 1894.48

## 2019-04-04 NOTE — PROGRESS NOTES
.  [facility]  Neurology Consultation  Patient Name: Bud Chiang  MRN: [id number]  : 1960  PCP: Pallas, Kenneth G  Date of Service: 2019    Assessment:  ***    Recommendations:  - ***    Patient was seen and discussed with  ***.    HPI:  59 year old male w/ PMH of cauda equina syndrome who presents with numbness of his feet presenting for follow-up of EMG results.      ROS: negative except as per HPI.    PMH:  Past Medical History:   Diagnosis Date     Depression      Depressive disorder      Hyperlipidemia      Left ear hearing loss      Lumbar disc herniation      Lumbar radiculopathy      Lumbar stenosis      Obese        PSH:  Past Surgical History:   Procedure Laterality Date     HEMILAMINECTOMY, DISCECTOMY LUMBAR ONE LEVEL, COMBINED N/A 2018    Procedure: COMBINED HEMILAMINECTOMY, DISCECTOMY LUMBAR ONE LEVEL;  Open Left Lumbar 4-5 Hemilaminectomy And Microdiscectomy,  Bilateral Approach;  Surgeon: Lilly Tijerina MD;  Location: [facility] OR     MOUTH SURGERY      wisdom teeth     ORTHOPEDIC SURGERY Left     left patella resection     TONSILLECTOMY         Medications:  Current Outpatient Medications   Medication     Acetaminophen (TYLENOL PO)     BuPROPion HCl (WELLBUTRIN PO)     gabapentin (NEURONTIN) 300 MG capsule     ibuprofen (ADVIL/MOTRIN) 200 MG tablet     SIMVASTATIN PO     No current facility-administered medications for this visit.        Allergies:  Allergies   Allergen Reactions     Eggs [Chicken-Derived Products (Egg)] GI Disturbance       Social History:  Social History     Socioeconomic History     Marital status:      Spouse name: Ruth Chiang     Number of children: 2     Years of education: Not on file     Highest education level: Not on file   Occupational History     Occupation:      Employer: [employer]   Social Needs     Financial resource strain: Not on file     Food insecurity:      Worry: Not on file     Inability: Not on file     Transportation needs:     Medical: Not on file     Non-medical: Not on file   Tobacco Use     Smoking status: Never Smoker     Smokeless tobacco: Never Used   Substance and Sexual Activity     Alcohol use: Yes     Alcohol/week: 6.0 oz     Types: 10 Cans of beer per week     Comment: WEEKLY     Drug use: No     Sexual activity: Yes     Partners: Female     Birth control/protection: Female Surgical   Lifestyle     Physical activity:     Days per week: Not on file     Minutes per session: Not on file     Stress: Not on file   Relationships     Social connections:     Talks on phone: Not on file     Gets together: Not on file     Attends Mandaeism service: Not on file     Active member of club or organization: Not on file     Attends meetings of clubs or organizations: Not on file     Relationship status: Not on file     Intimate partner violence:     Fear of current or ex partner: Not on file     Emotionally abused: Not on file     Physically abused: Not on file     Forced sexual activity: Not on file   Other Topics Concern     Parent/sibling w/ CABG, MI or angioplasty before 65F 55M? Yes   Social History Narrative     Not on file       Family History:  Family History   Problem Relation Age of Onset     Breast Cancer Mother      Lung Cancer Mother         mets to the liver     Hyperlipidemia Mother      Cancer Mother      Brain Cancer Father      Cancer Father      Brain Tumor Father      Hyperlipidemia Sister      Hyperlipidemia Brother      Coronary Artery Disease Brother      Myocardial Infarction Brother      Myocardial Infarction Maternal Grandfather      Heart Failure Paternal Half-Sister      Heart Disease Paternal Half-Brother        Physical Examination:  Vitals:                    General: adult, NAD, cooperative  Neuro:   Mental status: alert and oriented to person, place, and time; language is fluent with intact comprehension and naming   Cranial nerves:  PERRL, EOMI with intact smooth pursuit, full visual fields, fundi within normal limits, no facial asymmetry or ptosis, facial sensation intact and symmetric, hearing intact to conversation, tongue and uvula are midline, no hypophonia, no dysarthria   Motor: No abnormal posture, tone, atrophy, or movements/fasciculations.   RIGHT LEFT   Neck flexion *** ***    *** ***   FDI *** ***   2nd/3rd digit flexion *** ***   2nd/3rd digit extension *** ***   4th/5th digit flexion *** ***   4th/5th digit extension *** ***   Opponens pollicis *** ***   Biceps *** ***   Triceps *** ***   Deltoid *** ***   Hip flexion *** ***   Knee extension *** ***   Knee flexion *** ***   Dorsiflexion *** ***   Plantar flexion *** ***    Reflexes: *** Babinski. *** Dobson.   RIGHT LEFT   Brachioradialis ***+ ***+   Biceps ***+ ***+   Triceps ***+ ***+   Patellar ***+ ***+   Achilles ***+ ***+    Sensory: Intact to light touch, pin prick, and proprioception in all extremities without extinction. Romberg exam within normal limits.   Coordination: No dysmetria. No dysdiadochokinesia. No ataxia.   Gait: Normal width, stride length, turn, and arm swing.    Image Studies:  No results found for this or any previous visit (from the past 24 hour(s)).    Laboratory Studies:  EMG 3/12/19  Abnormal study. There is electrodiagnostic evidence of chronic bilateral L5 and S1 radiculopathies, with active denervation changes at the left L5 and bilateral S1 segments. There is no electrodiagnostic evidence of polyneuropathy.   This note was written with the assistance of the Dragon voice-dictation technology software. The final document, although reviewed, may contain errors. For corrections, please contact the office.

## 2019-04-04 NOTE — PROGRESS NOTES
[facility] Initial Evaluation  Subjective:  The history is provided by the patient. No  was used.   Bud Chiang is a 59 year old male with a right ankle condition.  Condition occurred with:  A twist.  Condition occurred: during recreation/sport.  This is a chronic condition  Pt c/o R ankle after twisting ankle while fishing 3/2018.  Noted significant swelling afterwards that resolved with rest and ice.  States pain and swelling improved but weakness remains.  Pt also had L4-5 laminectomy 1/2018-see lumbar eval.  MD order date 4/4/19.  PMH: L patella fx repair.    Patient reports pain:  Lateral and posterior.    Pain is described as aching, sharp, shooting and stabbing and is constant and reported as 6/10.  Associated symptoms:  Loss of strength, tingling and numbness. Pain is the same all the time.  Symptoms are exacerbated by walking, bending/squatting, ascending stairs, descending stairs and standing and relieved by rest.  Since onset symptoms are unchanged.        General health as reported by patient is good.  Pertinent medical history includes:  Depression and numbness/tingling.    Other surgeries include:  Orthopedic surgery (back and knee).  Current medications:  Anti-depressants.    Patient is working in normal job with restrictions (25# lifting restrictions).  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              Bud Chiang is a 59 year old male with a lumbar condition.  Condition occurred with:  Other reason and lifting.  Condition occurred: at home and at work.  This is a chronic and new condition  Pt c/o R Lx radicular pain of chronic/new nature.  States failed conservative treatment leading to L4-5 hemilaminectomy 1/26/18.  Noted significant improvement in pain and function, but has had lasting R lower leg weakness and N/T.  Saw MD 12/2018 leading to MRI showing L4-5 disc herniation.  EMG on 3/12/2019 also showed L5 and S1 dysfunction.  States  last week was lifting noted increased/return LBP.  MD order date 4/4/19.  .    Patient reports pain:  Central lumbar spine, lower lumbar spine, lumbar spine left, lumbar spine right, mid lumbar spine and upper lumbar spine.    Pain is described as shooting, aching and stabbing and is constant and reported as 6/10.  Associated symptoms:  Numbness and tingling (into R>L L/E). Pain is the same all the time.  Symptoms are exacerbated by bending, twisting, lifting, standing and walking and relieved by rest.  Since onset symptoms are unchanged.  Special tests:  EMG and MRI (L4-5 disc (MRI), L5 and S1 dysfunction (EMG)).      General health as reported by patient is good.  Pertinent medical history includes:  Depression and numbness/tingling.    Other surgeries include:  Other (back and L knee).  Current medications:  Anti-depressants.    Patient is working in normal job with restrictions (25# lifting restrictions).  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                                Objective:    Gait:      Deviations:  Ankle:  Push off decr R          Ankle/Foot Evaluation  ROM:  AROM is normal.PROM is normal.      Strength:    Dorsiflexion:  Left: /5 Strong/pain free    Pain:   Right: 5-/5    Pain:-  Plantarflexion: Left: /5 Strong/pain free  Pain:   Right: 4+/5   Pain:-  Inversion:Left: /5 strong/pain free Pain:     Right:  4+/5  Strong/pain free  Pain:-  Eversion:Left: /5 strong/pain free Pain:  Right: 4+/5   Pain:-                  LIGAMENT TESTING: normal                PALPATION: normal    EDEMA: normal            FUNCTIONAL TESTS:           Proprioception:  Stork Balance Test: Left: 5-10 sec  Right: 2-3 sec        Lumbar/SI Evaluation  ROM:    AROM Lumbar:   Flexion:          Min/mod loss +/-  Ext:                    Min loss + central   Side Bend:        Left:  Min loss +/-    Right:  Min loss +  Rotation:           Left:     Right:   Side Glide:        Left:     Right:         Strength: fair core  stab recruitment  Lumbar Myotomes:    T12-L3 (Hip Flex):  Left: 5    Right: 5  L2-4 (Quads):  Left:  5    Right:  5  L4 (Ankle DF):  Left:  5    Right:  5-  L5 (Great Toe Ext): Left: 5    Right: 5-   S1 (Toe Raise):  Left: 5    Right: 4+      Lumbar Dermtomes:    T12 Left:  Normal-light touch     T12 Right:  Normal-light touch  L1 Left:  Normal-light touch     L1 Right:  Normal-light touch  L2 Left:  Normal-light touch     L2 Right:  Normal-light touch  L3 Left:  Normal-light touch     L3 Right:  Normal-light touch  L4 Left:  Normal-light touch       L4 Right:  Normal-light touch  L5 Left:  Normal-light touch     L5 Right:  Hypo-light touch  S1 Left:  Normal-light touch     S1 Right:  Hypo-light touch  Neural Tension/Mobility:    Left side:  SLR w/DF and Nestor-Lumbar positive.   Right side:   SLR w/DF and Nestor-Lumbar positive.  Lumbar Palpation:    Tenderness present at Left:    Erector Spinae  Tenderness present at Right: Erector Spinae                                                     General     ROS    Assessment/Plan:    Patient is a 59 year old male with lumbar and right side ankle complaints.    Patient has the following significant findings with corresponding treatment plan.                Diagnosis 1:  R ankle/Lx radiculopathy  Pain -  hot/cold therapy, directional preference exercise and home program  Decreased ROM/flexibility - manual therapy and therapeutic exercise  Decreased strength - therapeutic exercise and therapeutic activities  Decreased proprioception - neuro re-education and therapeutic activities  Impaired gait - gait training  Impaired muscle performance - neuro re-education  Decreased function - therapeutic activities  Impaired posture - neuro re-education    Therapy Evaluation Codes:   Cumulative Therapy Evaluation is: Low complexity.    Previous and current functional limitations:  (See Goal Flow Sheet for this information)    Short term and Long term goals: (See Goal Flow Sheet for this  information)     Communication ability:  Patient appears to be able to clearly communicate and understand verbal and written communication and follow directions correctly.  Treatment Explanation - The following has been discussed with the patient:   RX ordered/plan of care  Anticipated outcomes  Possible risks and side effects  This patient would benefit from PT intervention to resume normal activities.   Rehab potential is good.    Frequency:  1 X week, once daily  Duration:  for 8 weeks  Discharge Plan:  Achieve all LTG.  Independent in home treatment program.  Reach maximal therapeutic benefit.    Please refer to the daily flowsheet for treatment today, total treatment time and time spent performing 1:1 timed codes.

## 2019-04-04 NOTE — LETTER
Date:April 15, 2019      Patient was self referred, no letter generated. Do not send.        [facility] Health Information

## 2019-04-04 NOTE — LETTER
4/4/2019       RE: Bud Chiang  [address]  [place] [address]     Dear Colleague,    Thank you for referring your patient, Bud Chiang, to the [facility] SPORTS AND ORTHOPAEDIC WALK IN CLINIC at [facility]. Please see a copy of my visit note below.          SPORTS & ORTHOPEDIC WALK-IN VISIT 4/4/2019    Primary Care Physician: Dr. Pallas  Here today for right ankle pain for the past year.  Had an inversion type injury roughly 1 year ago with significant swelling.  Not particularly painful though he was seen at an [facility] and reportedly x-rays are normal.  He continued to have some soreness.  Since that time he has pain in his right ankle diffusely when he has been doing a lot of standing or walking.  Additionally he has noted weakness in standing on his toes on the right side in comparison to the left.  He does have a history of lumbar spine surgery, an open left lumbar 4 5 hemilaminectomy and microdiscectomy.  Has recovered well though reports some persistent numbness in his left toes and right heel.  Has not used a brace or done physical therapy for his ankle.  Ibuprofen and ice seem to help when the pain and swelling flareup.    Reason for visit:     What part of your body is injured / painful?  right ankle    What caused the injury /pain? Unsure    How long ago did your injury occur or pain begin? About a year ago    What are your most bothersome symptoms? Pain    How would you characterize your symptom?  Throbbing, burning    What makes your symptoms better? Ibuprofen, ice    What makes your symptoms worse? Walking    Have you been previously seen for this problem? Yes, Neuro    Medical History:    Any recent changes to your medical history? No    Any new medication prescribed since last visit? No    Have you had surgery on this body part before? No    Social History:    Occupation: [employer]    Handedness:     Exercise: walking    Review of  "Systems:    Do you have fever, chills, weight loss? No    Do you have any vision problems? No    Do you have any chest pain or edema? No    Do you have any shortness of breath or wheezing?  No    Do you have stomach problems? No    Do you have any numbness or focal weakness? Yes, History of Cauda Equina Syndrome    Do you have diabetes? No    Do you have problems with bleeding or clotting? No    Do you have an rashes or other skin lesions? Yes, psoriasis          Past Medical History, Current Medications, and Allergies are reviewed in the electronic medical record as appropriate.       EXAM:Resp 16   Ht 1.803 m (5' 11\")   Wt 105.2 kg (232 lb)   BMI 32.36 kg/m       The patient is alert in no acute distress, pleasant and conversational.    Gait: nonantalgic gait    right foot  Skin is intact, no erythema or ecchymosis.  No soft tissue swelling, no joint effusion.  +2/4 dorsalis pedis pulse, sensation is decreased over posterior heel of right foot, otherwise intact    AROMPROM: Symmetric and pain-free.      Strength testing: dorsiflexion: 5/5, plantarflexion: 4+/5, inversion: 5/5, eversion: 5/5,     No tenderness to palpation of the medial or lateral malleolus.  No tenderness to palpation of the ATFL, PTFL or CFL.  No tenderness palpation of the peroneal tendon, posterior tibialis tendon or Achilles tendon    Calf is soft and nontender, no tenderness to palpation of the midfoot or the Lisfranc joint, no tenderness to palpation of the base of fifth metatarsal.      Imaging: xrays of right ankle performed and reviewed independently demonstrating no acute fracture or dislocation. No significant degenerative disease. See EMR for formal radiology report.       EMG dated 3/12/2019 and report per neurology:  Interpretation:    Abnormal study. There is electrodiagnostic evidence of chronic bilateral L5 and S1 radiculopathies, with active denervation changes at the left L5 and bilateral S1 segments. There is no " electrodiagnostic evidence of polyneuropathy.     Assessment: Patient is a 59 year old male with Paresthesia and weakness on right consistent with S1 radiculopathy. Suspect that intermittent ankle pain is related to instability and strength deficits. No acute injury or degenerative disease suspected    Recommendations:   Reviewed imaging and assessment with patient in detail  Recommended trial of physical therapy for ankle strengthening and stability  Recommended use of brace for support and comfort  F/u 6 weeks or prn     Georges Guillen MD                  Again, thank you for allowing me to participate in the care of your patient.      Sincerely,    Georges Guillen MD

## 2019-04-04 NOTE — PROGRESS NOTES
SPORTS & ORTHOPEDIC WALK-IN VISIT 4/4/2019    Primary Care Physician: Dr. Pallas  Here today for right ankle pain for the past year.  Had an inversion type injury roughly 1 year ago with significant swelling.  Not particularly painful though he was seen at an [facility] and reportedly x-rays are normal.  He continued to have some soreness.  Since that time he has pain in his right ankle diffusely when he has been doing a lot of standing or walking.  Additionally he has noted weakness in standing on his toes on the right side in comparison to the left.  He does have a history of lumbar spine surgery, an open left lumbar 4 5 hemilaminectomy and microdiscectomy.  Has recovered well though reports some persistent numbness in his left toes and right heel.  Has not used a brace or done physical therapy for his ankle.  Ibuprofen and ice seem to help when the pain and swelling flareup.    Reason for visit:     What part of your body is injured / painful?  right ankle    What caused the injury /pain? Unsure    How long ago did your injury occur or pain begin? About a year ago    What are your most bothersome symptoms? Pain    How would you characterize your symptom?  Throbbing, burning    What makes your symptoms better? Ibuprofen, ice    What makes your symptoms worse? Walking    Have you been previously seen for this problem? Yes, Neuro    Medical History:    Any recent changes to your medical history? No    Any new medication prescribed since last visit? No    Have you had surgery on this body part before? No    Social History:    Occupation: [employer]    Handedness:     Exercise: walking    Review of Systems:    Do you have fever, chills, weight loss? No    Do you have any vision problems? No    Do you have any chest pain or edema? No    Do you have any shortness of breath or wheezing?  No    Do you have stomach problems? No    Do you have any numbness or focal weakness? Yes, History of Cauda Equina  "Syndrome    Do you have diabetes? No    Do you have problems with bleeding or clotting? No    Do you have an rashes or other skin lesions? Yes, psoriasis          Past Medical History, Current Medications, and Allergies are reviewed in the electronic medical record as appropriate.       EXAM:Resp 16   Ht 1.803 m (5' 11\")   Wt 105.2 kg (232 lb)   BMI 32.36 kg/m      The patient is alert in no acute distress, pleasant and conversational.    Gait: nonantalgic gait    right foot  Skin is intact, no erythema or ecchymosis.  No soft tissue swelling, no joint effusion.  +2/4 dorsalis pedis pulse, sensation is decreased over posterior heel of right foot, otherwise intact    AROMPROM: Symmetric and pain-free.      Strength testing: dorsiflexion: 5/5, plantarflexion: 4+/5, inversion: 5/5, eversion: 5/5,     No tenderness to palpation of the medial or lateral malleolus.  No tenderness to palpation of the ATFL, PTFL or CFL.  No tenderness palpation of the peroneal tendon, posterior tibialis tendon or Achilles tendon    Calf is soft and nontender, no tenderness to palpation of the midfoot or the Lisfranc joint, no tenderness to palpation of the base of fifth metatarsal.      Imaging: xrays of right ankle performed and reviewed independently demonstrating no acute fracture or dislocation. No significant degenerative disease. See EMR for formal radiology report.       EMG dated 3/12/2019 and report per neurology:  Interpretation:    Abnormal study. There is electrodiagnostic evidence of chronic bilateral L5 and S1 radiculopathies, with active denervation changes at the left L5 and bilateral S1 segments. There is no electrodiagnostic evidence of polyneuropathy.     Assessment: Patient is a 59 year old male with Paresthesia and weakness on right consistent with S1 radiculopathy. Suspect that intermittent ankle pain is related to instability and strength deficits. No acute injury or degenerative disease " suspected    Recommendations:   Reviewed imaging and assessment with patient in detail  Recommended trial of physical therapy for ankle strengthening and stability  Recommended use of brace for support and comfort  F/u 6 weeks or prn     Georges Guillen MD

## 2019-04-04 NOTE — LETTER
"2019       RE: Bud Chiang  [address]  [facility] [address]     Dear Colleague,    Thank you for referring your patient, Bud Chiang, to the [facility] NEUROLOGY at [facility]. Please see a copy of my visit note below.    Service Date: 2019      RE: Bud Chiang   MRN: [id number]   : 1960      Dear Dr. Pallas:      This is in regard to followup on Bud Chiang.  The patient returned with his wife today, Ruth.  The patient's chief complaint is that of a prior cauda equina syndrome, continued back and right ankle pain as well as new low back discomfort.      The patient said that at work he did not recall any specific heavy lifting he was doing, but did note that he \"strained his lower back.\"  He did report this.  This involves the left lower back.  It seems to be localized.  He does not have radicular symptoms.  He denied any trouble with bowel or bladder function or impotence.  He still notes that the right ankle hurts and he does not know if it is painful related to weakness from his prior radiculopathies or to some type of problem.  He is willing to have evaluation today [facility].  The patient still is taking 900 mg of gabapentin at night.  This helps him.  He has been trying ibuprofen and he gets some benefit from it.      I did review with the patient and his wife his last MRI scan.  Earlier, I had reviewed it with him.  He does have some recurrent disk eccentric to the left at L5-S1.  Otherwise, the large free fragment is not present.  I reassured him about these findings.  He may have some foraminal narrowing bilaterally at L4-L5.      The patient did undergo EMG testing at my suggestion and this was done by Dr. Amaya.  It showed chronic bilateral L5 and S1 radiculopathies with active denervation changes on the left L5 and bilateral S1 segments.  He had no evidence of polyneuropathy.      Neurologic " examination showed that the patient again corkscrews upon arising from a flexed position.  He had lumbar paraspinal muscle spasm that was fairly mild today on the right greater than the left.  He cannot support his weight on his right toes.  Otherwise, he has normal strength throughout his legs in a seated position.  He noted tightness in the left posterior hip area at 90 degrees with straight leg raising.      I have discussed with the patient and his wife his various issues.  Some of this does now relate to workmen's compensation.  I suggested he get involved in our Physiatry Department here.  He may want to seek out a workmen's  elsewhere.  The patient does need also to get into physical therapy.  I went over with him again lifting restrictions.  I told him I would be happy to refer him back to his neurosurgeon, Dr. Tijerina.      He will be seen now in the General Neurology Department on a p.r.n. basis.      The patient spent 25 minutes with me today.  Over 50% of the time this involved counseling and coordination of care.        D: 2019   T: 2019   MT: AKA      Name:     GAUDENCIO ARMENTA   MRN:      [id number]        Account:      [id number]   :      1960           Service Date: 2019      Document: [id number]       Again, thank you for allowing me to participate in the care of your patient.      Sincerely,    Catarino Ponce MD    CC:  Kenneth G. Pallas, MD   [facility]   [id number] [place] [id number]

## 2019-04-05 NOTE — PROGRESS NOTES
"Service Date: 2019      Kenneth G. Pallas, MD   [facility]   [address] [place] [address]      RE: Bud Chiang   MRN: [id number]   : 1960      Dear Dr. Pallas:      This is in regard to followup on Bud Chiang.  The patient returned with his wife today, Ruth.  The patient's chief complaint is that of a prior cauda equina syndrome, continued back and right ankle pain as well as new low back discomfort.      The patient said that at work he did not recall any specific heavy lifting he was doing, but did note that he \"strained his lower back.\"  He did report this.  This involves the left lower back.  It seems to be localized.  He does not have radicular symptoms.  He denied any trouble with bowel or bladder function or impotence.  He still notes that the right ankle hurts and he does not know if it is painful related to weakness from his prior radiculopathies or to some type of problem.  He is willing to have evaluation today [facility].  The patient still is taking 900 mg of gabapentin at night.  This helps him.  He has been trying ibuprofen and he gets some benefit from it.      I did review with the patient and his wife his last MRI scan.  Earlier, I had reviewed it with him.  He does have some recurrent disk eccentric to the left at L5-S1.  Otherwise, the large free fragment is not present.  I reassured him about these findings.  He may have some foraminal narrowing bilaterally at L4-L5.      The patient did undergo EMG testing at my suggestion and this was done by Dr. Amaya.  It showed chronic bilateral L5 and S1 radiculopathies with active denervation changes on the left L5 and bilateral S1 segments.  He had no evidence of polyneuropathy.      Neurologic examination showed that the patient again corkscrews upon arising from a flexed position.  He had lumbar paraspinal muscle spasm that was fairly mild today on the right " greater than the left.  He cannot support his weight on his right toes.  Otherwise, he has normal strength throughout his legs in a seated position.  He noted tightness in the left posterior hip area at 90 degrees with straight leg raising.      I have discussed with the patient and his wife his various issues.  Some of this does now relate to workmen's compensation.  I suggested he get involved in our Physiatry Department here.  He may want to seek out a workmen's  elsewhere.  The patient does need also to get into physical therapy.  I went over with him again lifting restrictions.  I told him I would be happy to refer him back to his neurosurgeon, Dr. Tijerina.      He will be seen now in the General Neurology Department on a p.r.n. basis.      Thank you again for allowing me to see this patient.       Sincerely yours,       Lacie Ponce MD       The patient spent 25 minutes with me today.  Over 50% of the time this involved counseling and coordination of care.         LACIE PONCE MD             D: 2019   T: 2019   MT: AKA      Name:     GAUDENCIO ARMENTA   MRN:      [id number]        Account:      [id number]   :      1960           Service Date: 2019      Document: [id number]

## 2019-04-12 ENCOUNTER — TELEPHONE (OUTPATIENT)
Dept: PHYSICAL MEDICINE AND REHAB | Facility: CLINIC | Age: 59
End: 2019-04-12

## 2019-04-16 ENCOUNTER — TELEPHONE (OUTPATIENT)
Dept: PHYSICAL MEDICINE AND REHAB | Facility: CLINIC | Age: 59
End: 2019-04-16

## 2019-04-18 ENCOUNTER — THERAPY VISIT (OUTPATIENT)
Dept: PHYSICAL THERAPY | Facility: CLINIC | Age: 59
End: 2019-04-18
Payer: COMMERCIAL

## 2019-04-18 DIAGNOSIS — M54.50 ACUTE BILATERAL LOW BACK PAIN WITHOUT SCIATICA: ICD-10-CM

## 2019-04-18 DIAGNOSIS — M25.571 CHRONIC PAIN OF RIGHT ANKLE: ICD-10-CM

## 2019-04-18 DIAGNOSIS — G89.29 CHRONIC PAIN OF RIGHT ANKLE: ICD-10-CM

## 2019-04-18 PROCEDURE — 97112 NEUROMUSCULAR REEDUCATION: CPT | Mod: GP | Performed by: PHYSICAL THERAPIST

## 2019-04-18 PROCEDURE — 97110 THERAPEUTIC EXERCISES: CPT | Mod: GP | Performed by: PHYSICAL THERAPIST

## 2019-04-18 PROCEDURE — 97530 THERAPEUTIC ACTIVITIES: CPT | Mod: GP | Performed by: PHYSICAL THERAPIST

## 2019-06-03 NOTE — PROGRESS NOTES
Subjective:  HPI  Oswestry Score: 20 %                 Objective:  System    Physical Exam    General     ROS    Assessment/Plan:    DISCHARGE REPORT    Progress reporting period is from IE to 4/18/19.       SUBJECTIVE  Subjective changes noted by patient:  .  Subjective: Doing well, LBP minimal strength improving slowly.  Started return to gym    Current pain level is  Current Pain level: 4/10.     Previous pain level was   Initial Pain level: 6/10.   Changes in function:  Yes (See Goal flowsheet attached for changes in current functional level)  Adverse reaction to treatment or activity: None    OBJECTIVE  Changes noted in objective findings:    Objective: TR B x 25. SLS 20sec     ASSESSMENT/PLAN  Updated problem list and treatment plan: Diagnosis 1:  R ankle/Lx radiculopahty  Pain -  home program  Decreased ROM/flexibility - home program  Decreased strength - home program  Impaired gait - home program  Impaired muscle performance - home program  Decreased function - home program  Impaired posture - home program  STG/LTGs have been met or progress has been made towards goals:  Yes (See Goal flow sheet completed today.)  Assessment of Progress: The patient's condition is improving.  Self Management Plans:  Patient is independent in a home treatment program.  Patient is independent in self management of symptoms.  I have re-evaluated this patient and find that the nature, scope, duration and intensity of the therapy is appropriate for the medical condition of the patient.  Bud continues to require the following intervention to meet STG and LTG's:  PT intervention is no longer required to meet STG/LTG.    Recommendations:  This patient is ready to be discharged from therapy and continue their home treatment program.    Please refer to the daily flowsheet for treatment today, total treatment time and time spent performing 1:1 timed codes.

## 2019-11-04 ENCOUNTER — HEALTH MAINTENANCE LETTER (OUTPATIENT)
Age: 59
End: 2019-11-04

## 2019-11-08 DIAGNOSIS — M54.16 LUMBAR RADICULOPATHY: Primary | ICD-10-CM

## 2019-11-21 ENCOUNTER — OFFICE VISIT (OUTPATIENT)
Dept: NEUROSURGERY | Facility: CLINIC | Age: 59
End: 2019-11-21
Payer: COMMERCIAL

## 2019-11-21 ENCOUNTER — ANCILLARY PROCEDURE (OUTPATIENT)
Dept: GENERAL RADIOLOGY | Facility: CLINIC | Age: 59
End: 2019-11-21
Attending: NEUROLOGICAL SURGERY
Payer: COMMERCIAL

## 2019-11-21 VITALS
BODY MASS INDEX: 31.99 KG/M2 | WEIGHT: 236.2 LBS | OXYGEN SATURATION: 97 % | HEART RATE: 60 BPM | DIASTOLIC BLOOD PRESSURE: 76 MMHG | HEIGHT: 72 IN | SYSTOLIC BLOOD PRESSURE: 117 MMHG

## 2019-11-21 DIAGNOSIS — M54.16 LUMBAR RADICULOPATHY: ICD-10-CM

## 2019-11-21 DIAGNOSIS — M51.16 LUMBAR DISC HERNIATION WITH RADICULOPATHY: Primary | ICD-10-CM

## 2019-11-21 ASSESSMENT — ENCOUNTER SYMPTOMS
NECK PAIN: 0
MYALGIAS: 1
MUSCLE WEAKNESS: 0
STIFFNESS: 1
ARTHRALGIAS: 0
MUSCLE CRAMPS: 1
JOINT SWELLING: 0
BACK PAIN: 1

## 2019-11-21 ASSESSMENT — MIFFLIN-ST. JEOR: SCORE: 1925.43

## 2019-11-21 ASSESSMENT — PAIN SCALES - GENERAL: PAINLEVEL: NO PAIN (0)

## 2019-11-21 NOTE — PROGRESS NOTES
It was a pleasure to evaluate Mr. Bud Chiang today. He had surgery with me on 1/26/18 urgently for cauda equina syndrome.    Mr. Chiang is a 58 year old male emergently referred to me by Dr. Catarino Ponce with severe spinal stenosis at L4-5 due to very large disc herniation with cephalad migration. The disc herniation was occupying over 80% of canal volume and causing complete effacement of CSF with severe stenosis. Mr. Chiang had severe low back pain and severe bilateral radicular leg pain, as well as difficulty initiating urinary stream. Therefore he was taken to surgery urgently within 24 hours of my initial consultation with him in my clinic.  Because the disc herniation had a large central component, I performed a midline open approach at L4-5 to enable bilateral microdiskectomy to ensure complete removal and decompression.      He underwent surgery on 1/26/18 for bilateral L4-5 hemilaminectomy and bilateral microdiskectomy.     Mr. Chiang today states that he has had complete resolution of his pre-operative symptoms. He has chronic tingling in his toes.    He says he is here to be sure that he does not need any more spine surgery prior to retiring from work.    He has normal bladder function and no leg radiating pain. His leg numbness and weakness are significantly improved compared to prior to surgery.     He states that he is very pleased with his surgical results.    MRI postop shows significant improvement in canal stenosis  Xray lumbar spine no malalignment       On physical examination, his strength is 5/5 throughout bilateral hip flexion, knee extension, dorsiflexion, extensor hallucis longus, plantar flexion; he has overall improvement in sensation- his previously numb right foot now has sensation, he has some dysesthesias in the right heel as the sensation has returned which are minor; incision is well-healed.    He had an EMG in March 2019 that showed some left L5 and bilateral S1 chronic  radiculopathy       Preop-       Postop-          ASSESSMENT:  Doing well 22 months s/p emergent L4-5 diskectomy for cauda equina syndrome    PLAN:  On physical exam he has recovered markedly well from cauda equina syndrome, no radicular pain currently, no progressive symptoms  No need for scheduled followup or further intervention   Recommend core strengthening for general lumbar health.      I spent 15 minutes in patient care with greater than 50% spent in counseling and/or coordination of care.    I performed independent visualization of radiographic imaging and entered my own interpretation,

## 2019-11-21 NOTE — NURSING NOTE
Chief Complaint   Patient presents with     RECHECK     [facility] RETURN - FOLLOW UP, NUMBNESS IN TOES, HAD EMG, XRAYS PRIOR       Patrick Llanos, EMT

## 2019-11-21 NOTE — LETTER
11/21/2019       RE: Bud Chiang  [address]  [facility] [address]     Dear Colleague,    Thank you for referring your patient, Bud Chiang, to the [facility] NEUROSURGERY at [facility]. Please see a copy of my visit note below.    It was a pleasure to evaluate Mr. Bud Chiang today . He had surgery with me on 1/26/18 urgently for cauda equina syndrome.    Mr. Chiang is a 58 year old male emergently referred to me by Dr. Catarino Ponce with severe spinal stenosis at L4-5 due to very large disc herniation with cephalad migration. The disc herniation was occupying over 80% of canal volume and causing complete effacement of CSF with severe stenosis. Mr. Chiang had severe low back pain and severe bilateral radicular leg pain, as well as difficulty initiating urinary stream. Therefore he was taken to surgery urgently within 24 hours of my initial consultation with him in my clinic.  Because the disc herniation had a large central component, I performed a midline open approach at L4-5 to enable bilateral microdiskectomy to ensure complete removal and decompression.      He underwent surgery on 1/26/18 for bilateral L4-5 hemilaminectomy and bilateral microdiskectomy.     Mr. Chiang today states that he has had complete resolution of his pre-operative symptoms. He has chronic tingling in his toes.    He says he is here to be sure that he does not need any more spine surgery prior to retiring from work.    He has normal bladder function and no leg radiating pain. His leg numbness and weakness are significantly improved compared to prior to surgery.     He states that he is very pleased with his surgical results.    MRI postop shows significant improvement in canal stenosis  Xray lumbar spine no malalignment       On physical examination, his strength is 5/5 throughout bilateral hip flexion, knee extension, dorsiflexion, extensor hallucis longus, plantar flexion; he has  overall improvement in sensation- his previously numb right foot now has sensation, he has some dysesthesias in the right heel as the sensation has returned which are minor; incision is well-healed.    He had an EMG in March 2019 that showed some left L5 and bilateral S1 chronic radiculopathy       Preop-       Postop-          ASSESSMENT:  Doing well 22 months s/p emergent L4-5 diskectomy for cauda equina syndrome    PLAN:  On physical exam he has recovered markedly well from cauda equina syndrome, no radicular pain currently, no progressive symptoms  No need for scheduled followup or further intervention   Recommend core strengthening for general lumbar health.      I spent 15 minutes in patient care with greater than 50% spent in counseling and/or coordination of care.    I performed independent visualization of radiographic imaging and entered my own interpretation,     Lilly Tijerina MD

## 2020-11-22 ENCOUNTER — HEALTH MAINTENANCE LETTER (OUTPATIENT)
Age: 60
End: 2020-11-22

## 2021-09-18 ENCOUNTER — HEALTH MAINTENANCE LETTER (OUTPATIENT)
Age: 61
End: 2021-09-18

## 2022-01-08 ENCOUNTER — HEALTH MAINTENANCE LETTER (OUTPATIENT)
Age: 62
End: 2022-01-08

## 2022-11-20 ENCOUNTER — HEALTH MAINTENANCE LETTER (OUTPATIENT)
Age: 62
End: 2022-11-20

## 2023-04-15 ENCOUNTER — HEALTH MAINTENANCE LETTER (OUTPATIENT)
Age: 63
End: 2023-04-15

## (undated) DEVICE — SU VICRYL 0 UR-6 27" J603H

## (undated) DEVICE — NDL BLUNT 17GA 1.5" 8881202330

## (undated) DEVICE — BUR MATCHSTICK 3MM ANSPACH L-8NS-G1

## (undated) DEVICE — GLOVE PROTEXIS POWDER FREE 7.0 ORTHOPEDIC 2D73ET70

## (undated) DEVICE — SU VICRYL 0 CT-1 CR 8X18" J740D

## (undated) DEVICE — DRAPE STERI TOWEL LG 1010

## (undated) DEVICE — SPONGE COTTONOID 1/2X1/2" 20-04S

## (undated) DEVICE — SOL WATER IRRIG 1000ML BOTTLE 2F7114

## (undated) DEVICE — SYR 30ML LL W/O NDL 302832

## (undated) DEVICE — ADH FLOSEAL W/HUMAN THROMBIN 5ML

## (undated) DEVICE — CATH TRAY FOLEY SURESTEP 16FR W/URNE MTR STLK LATEX A303316A

## (undated) DEVICE — SUCTION MANIFOLD DORNOCH ULTRA CART UL-CL500

## (undated) DEVICE — DRSG PRIMAPORE 03 1/8X6" 66000318

## (undated) DEVICE — KIT PATIENT CARE JACKSON SPINE PACK 5808PV

## (undated) DEVICE — SPONGE SURGIFOAM 100 1974

## (undated) DEVICE — SU VICRYL 2-0 CT-2 27" J333H

## (undated) DEVICE — SU MONOCRYL 4-0 PS-2 27" UND Y426H

## (undated) DEVICE — PREP CHLORAPREP CLEAR 3ML 260400

## (undated) DEVICE — DRAPE C-ARM W/STRAPS 42X72" 07-CA104

## (undated) DEVICE — LINEN TOWEL PACK X6 WHITE 5487

## (undated) DEVICE — PREP CHLORAPREP 26ML TINTED ORANGE  260815

## (undated) DEVICE — SOL NACL 0.9% IRRIG 1000ML BOTTLE 2F7124

## (undated) DEVICE — DRAPE MICROSCOPE LEICA 54X150" AR8033650

## (undated) DEVICE — SU VICRYL 2-0 CT-2 CR 8X18" J726D

## (undated) DEVICE — LINEN TOWEL PACK X5 5464

## (undated) DEVICE — SU DERMABOND ADVANCED .7ML DNX12

## (undated) DEVICE — DRAPE MAYO STAND 23X54 8337

## (undated) DEVICE — GLOVE PROTEXIS BLUE W/NEU-THERA 7.5  2D73EB75

## (undated) DEVICE — WIPES FOLEY CARE SURESTEP PROVON DFC100

## (undated) DEVICE — PACK NEURO MINOR UMMC SNE32MNMU4

## (undated) RX ORDER — ACETAMINOPHEN 325 MG/1
TABLET ORAL
Status: DISPENSED
Start: 2018-01-26

## (undated) RX ORDER — ONDANSETRON 2 MG/ML
INJECTION INTRAMUSCULAR; INTRAVENOUS
Status: DISPENSED
Start: 2018-01-26

## (undated) RX ORDER — BACITRACIN 50000 [IU]/1
INJECTION, POWDER, FOR SOLUTION INTRAMUSCULAR
Status: DISPENSED
Start: 2018-01-26

## (undated) RX ORDER — PROPOFOL 10 MG/ML
INJECTION, EMULSION INTRAVENOUS
Status: DISPENSED
Start: 2018-01-26

## (undated) RX ORDER — VANCOMYCIN HYDROCHLORIDE 500 MG/10ML
INJECTION, POWDER, LYOPHILIZED, FOR SOLUTION INTRAVENOUS
Status: DISPENSED
Start: 2018-01-26

## (undated) RX ORDER — PHENYLEPHRINE HCL IN 0.9% NACL 1 MG/10 ML
SYRINGE (ML) INTRAVENOUS
Status: DISPENSED
Start: 2018-01-26

## (undated) RX ORDER — EPHEDRINE SULFATE 50 MG/ML
INJECTION, SOLUTION INTRAMUSCULAR; INTRAVENOUS; SUBCUTANEOUS
Status: DISPENSED
Start: 2018-01-26

## (undated) RX ORDER — CEFAZOLIN SODIUM 2 G/100ML
INJECTION, SOLUTION INTRAVENOUS
Status: DISPENSED
Start: 2018-01-26

## (undated) RX ORDER — OXYCODONE AND ACETAMINOPHEN 5; 325 MG/1; MG/1
TABLET ORAL
Status: DISPENSED
Start: 2018-01-26

## (undated) RX ORDER — BUPIVACAINE HYDROCHLORIDE AND EPINEPHRINE 5; 5 MG/ML; UG/ML
INJECTION, SOLUTION EPIDURAL; INTRACAUDAL; PERINEURAL
Status: DISPENSED
Start: 2018-01-26

## (undated) RX ORDER — FENTANYL CITRATE 50 UG/ML
INJECTION, SOLUTION INTRAMUSCULAR; INTRAVENOUS
Status: DISPENSED
Start: 2018-01-26